# Patient Record
Sex: MALE | Race: ASIAN | NOT HISPANIC OR LATINO | Employment: OTHER
[De-identification: names, ages, dates, MRNs, and addresses within clinical notes are randomized per-mention and may not be internally consistent; named-entity substitution may affect disease eponyms.]

---

## 2024-02-02 ENCOUNTER — TRANSCRIBE ORDERS (OUTPATIENT)
Dept: OTHER | Age: 59
End: 2024-02-02

## 2024-02-02 ENCOUNTER — MEDICAL CORRESPONDENCE (OUTPATIENT)
Dept: HEALTH INFORMATION MANAGEMENT | Facility: CLINIC | Age: 59
End: 2024-02-02
Payer: MEDICARE

## 2024-02-02 DIAGNOSIS — H91.91 UNSPECIFIED HEARING LOSS, RIGHT EAR: Primary | ICD-10-CM

## 2025-01-20 ENCOUNTER — OFFICE VISIT (OUTPATIENT)
Dept: FAMILY MEDICINE | Facility: OTHER | Age: 60
End: 2025-01-20
Payer: MEDICARE

## 2025-01-20 VITALS
HEART RATE: 67 BPM | SYSTOLIC BLOOD PRESSURE: 128 MMHG | RESPIRATION RATE: 19 BRPM | WEIGHT: 124 LBS | DIASTOLIC BLOOD PRESSURE: 78 MMHG | TEMPERATURE: 96.6 F | BODY MASS INDEX: 24.35 KG/M2 | HEIGHT: 60 IN | OXYGEN SATURATION: 97 %

## 2025-01-20 DIAGNOSIS — Z79.4 TYPE 2 DIABETES MELLITUS WITH STAGE 3B CHRONIC KIDNEY DISEASE, WITH LONG-TERM CURRENT USE OF INSULIN (H): Primary | ICD-10-CM

## 2025-01-20 DIAGNOSIS — E78.5 HYPERLIPIDEMIA LDL GOAL <70: ICD-10-CM

## 2025-01-20 DIAGNOSIS — N18.32 TYPE 2 DIABETES MELLITUS WITH STAGE 3B CHRONIC KIDNEY DISEASE, WITH LONG-TERM CURRENT USE OF INSULIN (H): Primary | ICD-10-CM

## 2025-01-20 DIAGNOSIS — G89.29 CHRONIC BILATERAL LOW BACK PAIN WITHOUT SCIATICA: ICD-10-CM

## 2025-01-20 DIAGNOSIS — L30.9 ECZEMA, UNSPECIFIED TYPE: ICD-10-CM

## 2025-01-20 DIAGNOSIS — R41.3 MEMORY LOSS: ICD-10-CM

## 2025-01-20 DIAGNOSIS — I10 BENIGN ESSENTIAL HYPERTENSION: ICD-10-CM

## 2025-01-20 DIAGNOSIS — Z23 NEED FOR PNEUMOCOCCAL VACCINATION: ICD-10-CM

## 2025-01-20 DIAGNOSIS — Z23 NEED FOR INFLUENZA VACCINATION: ICD-10-CM

## 2025-01-20 DIAGNOSIS — M54.50 CHRONIC BILATERAL LOW BACK PAIN WITHOUT SCIATICA: ICD-10-CM

## 2025-01-20 DIAGNOSIS — F33.0 MILD RECURRENT MAJOR DEPRESSION: ICD-10-CM

## 2025-01-20 DIAGNOSIS — E11.22 TYPE 2 DIABETES MELLITUS WITH STAGE 3B CHRONIC KIDNEY DISEASE, WITH LONG-TERM CURRENT USE OF INSULIN (H): Primary | ICD-10-CM

## 2025-01-20 DIAGNOSIS — E03.9 HYPOTHYROIDISM, UNSPECIFIED TYPE: ICD-10-CM

## 2025-01-20 DIAGNOSIS — Z74.09 IMPAIRED MOBILITY: ICD-10-CM

## 2025-01-20 DIAGNOSIS — M1A.9XX1 CHRONIC TOPHACEOUS GOUT: ICD-10-CM

## 2025-01-20 DIAGNOSIS — J44.9 CHRONIC OBSTRUCTIVE PULMONARY DISEASE, UNSPECIFIED COPD TYPE (H): ICD-10-CM

## 2025-01-20 LAB
ALBUMIN SERPL BCG-MCNC: 4.4 G/DL (ref 3.5–5.2)
ALP SERPL-CCNC: 140 U/L (ref 40–150)
ALT SERPL W P-5'-P-CCNC: 36 U/L (ref 0–70)
ANION GAP SERPL CALCULATED.3IONS-SCNC: 11 MMOL/L (ref 7–15)
AST SERPL W P-5'-P-CCNC: 20 U/L (ref 0–45)
BILIRUB SERPL-MCNC: 1.3 MG/DL
BUN SERPL-MCNC: 19.7 MG/DL (ref 8–23)
CALCIUM SERPL-MCNC: 9.6 MG/DL (ref 8.8–10.4)
CHLORIDE SERPL-SCNC: 109 MMOL/L (ref 98–107)
CHOLEST SERPL-MCNC: 148 MG/DL
CREAT SERPL-MCNC: 1.38 MG/DL (ref 0.67–1.17)
EGFRCR SERPLBLD CKD-EPI 2021: 58 ML/MIN/1.73M2
EST. AVERAGE GLUCOSE BLD GHB EST-MCNC: 272 MG/DL
FASTING STATUS PATIENT QL REPORTED: YES
FASTING STATUS PATIENT QL REPORTED: YES
GLUCOSE SERPL-MCNC: 173 MG/DL (ref 70–99)
HBA1C MFR BLD: 11.1 % (ref 0–5.6)
HCO3 SERPL-SCNC: 24 MMOL/L (ref 22–29)
HDLC SERPL-MCNC: 31 MG/DL
LDLC SERPL CALC-MCNC: 76 MG/DL
NONHDLC SERPL-MCNC: 117 MG/DL
POTASSIUM SERPL-SCNC: 4.2 MMOL/L (ref 3.4–5.3)
PROT SERPL-MCNC: 7.4 G/DL (ref 6.4–8.3)
SODIUM SERPL-SCNC: 144 MMOL/L (ref 135–145)
T4 FREE SERPL-MCNC: 2.69 NG/DL (ref 0.9–1.7)
TRIGL SERPL-MCNC: 204 MG/DL
TSH SERPL DL<=0.005 MIU/L-ACNC: 0.29 UIU/ML (ref 0.3–4.2)
URATE SERPL-MCNC: 7.2 MG/DL (ref 3.4–7)

## 2025-01-20 PROCEDURE — 80061 LIPID PANEL: CPT | Performed by: PHYSICIAN ASSISTANT

## 2025-01-20 PROCEDURE — 84443 ASSAY THYROID STIM HORMONE: CPT | Performed by: PHYSICIAN ASSISTANT

## 2025-01-20 PROCEDURE — G0008 ADMIN INFLUENZA VIRUS VAC: HCPCS | Performed by: PHYSICIAN ASSISTANT

## 2025-01-20 PROCEDURE — 83036 HEMOGLOBIN GLYCOSYLATED A1C: CPT | Performed by: PHYSICIAN ASSISTANT

## 2025-01-20 PROCEDURE — 99207 PR FOOT EXAM NO CHARGE: CPT | Performed by: PHYSICIAN ASSISTANT

## 2025-01-20 PROCEDURE — 84439 ASSAY OF FREE THYROXINE: CPT | Performed by: PHYSICIAN ASSISTANT

## 2025-01-20 PROCEDURE — 84550 ASSAY OF BLOOD/URIC ACID: CPT | Performed by: PHYSICIAN ASSISTANT

## 2025-01-20 PROCEDURE — 90677 PCV20 VACCINE IM: CPT | Performed by: PHYSICIAN ASSISTANT

## 2025-01-20 PROCEDURE — 80053 COMPREHEN METABOLIC PANEL: CPT | Performed by: PHYSICIAN ASSISTANT

## 2025-01-20 PROCEDURE — 90673 RIV3 VACCINE NO PRESERV IM: CPT | Performed by: PHYSICIAN ASSISTANT

## 2025-01-20 PROCEDURE — 99205 OFFICE O/P NEW HI 60 MIN: CPT | Mod: 25 | Performed by: PHYSICIAN ASSISTANT

## 2025-01-20 PROCEDURE — 36415 COLL VENOUS BLD VENIPUNCTURE: CPT | Performed by: PHYSICIAN ASSISTANT

## 2025-01-20 PROCEDURE — G0009 ADMIN PNEUMOCOCCAL VACCINE: HCPCS | Performed by: PHYSICIAN ASSISTANT

## 2025-01-20 RX ORDER — EZETIMIBE 10 MG/1
10 TABLET ORAL DAILY
Qty: 90 TABLET | Refills: 3 | Status: SHIPPED | OUTPATIENT
Start: 2025-01-20

## 2025-01-20 RX ORDER — BENAZEPRIL HYDROCHLORIDE 10 MG/1
10 TABLET ORAL DAILY
Qty: 90 TABLET | Refills: 3 | Status: SHIPPED | OUTPATIENT
Start: 2025-01-20

## 2025-01-20 RX ORDER — BENAZEPRIL HYDROCHLORIDE 20 MG/1
1 TABLET ORAL DAILY
COMMUNITY
End: 2025-01-20

## 2025-01-20 RX ORDER — VITAMIN B COMPLEX
2000 TABLET ORAL DAILY
COMMUNITY

## 2025-01-20 RX ORDER — ATORVASTATIN CALCIUM 80 MG/1
1 TABLET, FILM COATED ORAL AT BEDTIME
COMMUNITY
End: 2025-01-20

## 2025-01-20 RX ORDER — GABAPENTIN 300 MG/1
300 CAPSULE ORAL AT BEDTIME
Qty: 90 CAPSULE | Refills: 3 | Status: SHIPPED | OUTPATIENT
Start: 2025-01-20

## 2025-01-20 RX ORDER — EZETIMIBE 10 MG/1
1 TABLET ORAL DAILY
COMMUNITY
End: 2025-01-20

## 2025-01-20 RX ORDER — INSULIN GLARGINE 100 [IU]/ML
35 INJECTION, SOLUTION SUBCUTANEOUS AT BEDTIME
Qty: 30 ML | Refills: 1 | Status: SHIPPED | OUTPATIENT
Start: 2025-01-20

## 2025-01-20 RX ORDER — INSULIN LISPRO 100 [IU]/ML
15 INJECTION, SOLUTION INTRAVENOUS; SUBCUTANEOUS
Qty: 30 ML | Refills: 1 | Status: SHIPPED | OUTPATIENT
Start: 2025-01-20

## 2025-01-20 RX ORDER — GABAPENTIN 300 MG/1
300 CAPSULE ORAL AT BEDTIME
COMMUNITY
Start: 2023-02-07 | End: 2025-01-20

## 2025-01-20 RX ORDER — INSULIN GLARGINE 100 [IU]/ML
35 INJECTION, SOLUTION SUBCUTANEOUS AT BEDTIME
COMMUNITY
Start: 2023-02-07 | End: 2025-01-20

## 2025-01-20 RX ORDER — LEVOTHYROXINE SODIUM 175 UG/1
175 TABLET ORAL
COMMUNITY

## 2025-01-20 RX ORDER — ATORVASTATIN CALCIUM 80 MG/1
80 TABLET, FILM COATED ORAL AT BEDTIME
Qty: 90 TABLET | Refills: 3 | Status: SHIPPED | OUTPATIENT
Start: 2025-01-20

## 2025-01-20 RX ORDER — DULAGLUTIDE 4.5 MG/.5ML
4.5 INJECTION, SOLUTION SUBCUTANEOUS WEEKLY
Qty: 6 ML | Refills: 1 | Status: SHIPPED | OUTPATIENT
Start: 2025-01-20

## 2025-01-20 RX ORDER — ALLOPURINOL 100 MG/1
100 TABLET ORAL DAILY
COMMUNITY
End: 2025-01-21

## 2025-01-20 RX ORDER — DULAGLUTIDE 4.5 MG/.5ML
4.5 INJECTION, SOLUTION SUBCUTANEOUS WEEKLY
COMMUNITY
Start: 2024-03-07 | End: 2025-01-20

## 2025-01-20 RX ORDER — INSULIN LISPRO 100 [IU]/ML
15 INJECTION, SOLUTION INTRAVENOUS; SUBCUTANEOUS
COMMUNITY
Start: 2025-01-10 | End: 2025-01-20

## 2025-01-20 RX ORDER — ASPIRIN 81 MG/1
81 TABLET ORAL DAILY
Qty: 90 TABLET | Refills: 3 | Status: SHIPPED | OUTPATIENT
Start: 2025-01-20

## 2025-01-20 RX ORDER — ESCITALOPRAM OXALATE 10 MG/1
1 TABLET ORAL DAILY
COMMUNITY
End: 2025-01-20

## 2025-01-20 RX ORDER — ASPIRIN 81 MG/1
1 TABLET ORAL DAILY
COMMUNITY
End: 2025-01-20

## 2025-01-20 RX ORDER — ESCITALOPRAM OXALATE 10 MG/1
15 TABLET ORAL DAILY
Qty: 135 TABLET | Refills: 3 | Status: SHIPPED | OUTPATIENT
Start: 2025-01-20

## 2025-01-20 ASSESSMENT — PAIN SCALES - GENERAL: PAINLEVEL_OUTOF10: NO PAIN (0)

## 2025-01-20 NOTE — PATIENT INSTRUCTIONS
Get back on the insulin.    Will check updated labs today.    Stay well hydrated.    Will cut back on the enalapril to avoid blood pressure getting too low.  If blood pressure gets too high (above 140/90 consistently), let me know.    Will order physical therapy for your house.    Will have him see a nephrologist, rheumatologist, neurologist and mental health specialist.    Will increase the escitalopram to 15 mg (1.5 tablets) for his mood.    Try Vaseline and hydrocortisone for the rash on the forehead.    Follow-up in 3 months.

## 2025-01-20 NOTE — PROGRESS NOTES
Assessment & Plan       ICD-10-CM    1. Type 2 diabetes mellitus with stage 3b chronic kidney disease, with long-term current use of insulin (H)  E11.22 Comprehensive metabolic panel (BMP + Alb, Alk Phos, ALT, AST, Total. Bili, TP)    N18.32 Hemoglobin A1c    Z79.4 Comprehensive metabolic panel (BMP + Alb, Alk Phos, ALT, AST, Total. Bili, TP)     Dulaglutide (TRULICITY) 4.5 MG/0.5ML SOAJ     HUMALOG KWIKPEN 100 UNIT/ML soln     LANTUS SOLOSTAR 100 UNIT/ML soln     Home Care Referral     Continuous Glucose Sensor (FREESTYLE ELPIDIO 2 SENSOR) [facility]     FOOT EXAM     Adult Nephrology  Referral     Comprehensive metabolic panel (BMP + Alb, Alk Phos, ALT, AST, Total. Bili, TP)     Hemoglobin A1c     CANCELED: Comprehensive metabolic panel (BMP + Alb, Alk Phos, ALT, AST, Total. Bili, TP)      2. Benign essential hypertension  I10 benazepril (LOTENSIN) 10 MG tablet     Comprehensive metabolic panel (BMP + Alb, Alk Phos, ALT, AST, Total. Bili, TP)     Comprehensive metabolic panel (BMP + Alb, Alk Phos, ALT, AST, Total. Bili, TP)     Comprehensive metabolic panel (BMP + Alb, Alk Phos, ALT, AST, Total. Bili, TP)     CANCELED: Comprehensive metabolic panel (BMP + Alb, Alk Phos, ALT, AST, Total. Bili, TP)      3. Hyperlipidemia LDL goal <70  E78.5 Lipid panel reflex to direct LDL Non-fasting     atorvastatin (LIPITOR) 80 MG tablet     aspirin 81 MG EC tablet     ezetimibe (ZETIA) 10 MG tablet     Lipid panel reflex to direct LDL Non-fasting      4. Chronic tophaceous gout  M1A.9XX1 Uric acid     Adult Rheumatology  Referral     Uric acid      5. Hypothyroidism, unspecified type  E03.9 TSH with free T4 reflex     TSH with free T4 reflex      6. Mild recurrent major depression  F33.0 Adult Mental Health  Referral     Adult Mental Health  Referral     escitalopram (LEXAPRO) 10 MG tablet      7. Chronic obstructive pulmonary disease, unspecified COPD type (H)  J44.9 Home Care Referral      8.  Impaired mobility  Z74.09 Home Care Referral      9. Memory loss  R41.3 Adult Neurology [facility] Referral     Home Care Referral      10. Chronic bilateral low back pain without sciatica  M54.50 gabapentin (NEURONTIN) 300 MG capsule    G89.29 Home Care Referral      11. Eczema, unspecified type  L30.9       12. Need for pneumococcal vaccination  Z23 PNEUMOCOCCAL 20 VALENT CONJUGATE (PREVNAR 20)      13. Need for influenza vaccination  Z23 INFLUENZA VACCINE TRIVALENT(FLUBLOK)          1. Unfortunately, his diabetic control is poor but he has been without insulin for multiple weeks this past month. Will refill Lantus to continue 35 units nightly along with Humalog 15 units prior to meals. He remains on weekly Trulicity and I recommend he avoid skipping meals. He should continue with close glucose monitoring and he declines a diabetic educator referral at this time. He is flirting with stage 4 CKD so will refer to nephrology for further evaluation. Continue with good hydration and low salt. Will plan on recheck in 3 months.    2. Blood pressure has been well controlled on benazepril but he has lightheadedness when systolic readings are less than 130. Will try decreasing the dose to 10 mg and I recommend close home BP monitoring. If consistently above 140-150/90, they will let me know.     3. Updated fasting lipid panel ordered. Continue Zetia and atorvastatin.     4, 8, 10. Chronic pain from tophaceous gout and his low back. Continue nightly gabapentin. His daughter states he was previously on 300 mg of allopurinol but was recently restarted back on 100 mg. Will recheck a uric acid today along with kidney function and will increase as needed. Will also refer to rheumatology through [facility]. He has been less mobile lately and often confined to a wheelchair. I recommend home physical therapy so referral has been placed. He does not drive any longer.     5. Updated thyroid labs ordered. Will refill levothyroxine  "depending on lab results.    6. His mood has been worse as he is not able to walk much or do the things he used to. Will increase Lexapro to 15 mg and referral to psychiatry and for counseling.     7. Continue albuterol inhaler as needed.    9. His daughter has noticed worsening memory so will refer to neurology for further evaluation. I recommend he keep his mind active with puzzles, games, reading, etc. He should also stay physically active.    11. He has an eczematous area over his forehead. I recommend Vaseline and hydrocortisone cream.    12-13. Vaccines administered by MA.    Follow-up in 3 months.    A total of 60 minutes spent on reviewing history, completing exam, discussing plan and completing note.      BMI  Estimated body mass index is 25.92 kg/m  as calculated from the following:    Height as of this encounter: 1.473 m (4' 10\").    Weight as of this encounter: 56.2 kg (124 lb).   Weight management plan: Patient referred to endocrine and/or weight management specialty        Gurjit Ortiz is a 59 year old, presenting for the following health issues:  Establish Care        1/20/2025    12:12 PM   Additional Questions   Roomed by eleanor   Accompanied by shannon         1/20/2025    12:12 PM   Patient Reported Additional Medications   Patient reports taking the following new medications allopurinol 100 mg, escitalopram 10 mg, ezetimibe 10 mg, levothyroxine 0.175 mg, asprin 81 mg, benazepril 20 mg, atrovastatin 80 mg, insulin     Patient's daughter would like to talk about FMLA.  History of Present Illness       Reason for visit:  Establish care with new doctor   He is taking medications regularly.     Visit completed with assistance of patient's daughter and [employer]ura video interpretor.    He is here to establish care today. He lived in [place] for a number of years after immigrating to the  and has been seeing a [facility] provider in [place] the past few years. He lives with his daughter in [place] and " "wanted to find a provider closer to home. He has a history of insulin dependent type 2 diabetes, COPD, hypertension, hyperlipidemia, tophaceous gout, chronic low back pain, vitamin D deficiency, depression and hypothyroidism. His daughter is concerned about his declined in health over the past year. He has become less mobile due to his chronic pain in the low back along with his feet and elbows due to his tophaceous gout. He is not walking as much and is often in a wheelchair now for mobility and his legs seem to be getting weaker. This has caused increased depression and he sometimes tells her he would rather not be here anymore. He denies any active thoughts of self harm. She also states his memory has been worsening as he is forgetting more things and sometimes he talks to himself. She is hoping to get referrals for a mental health specialist, neurologist, physical therapy, nephrology and rheumatologist. He has an appointment scheduled with audiology in the near future due to hearing loss.    His daughter mentions that if his systolic blood pressure is lower than 130, he feels dizzy, lightheadedness and sometimes has headaches. She is wondering if his medication needs to be adjusted. He has been out of insulin the past few weeks as he ran out. He initially had some insurances issues towards the end of this past year as he could not get insulin refills but now he just ran out of his insulin. He remains on Humalog, Lantus and weekly Trulicity. He sometimes skips meals per his daughter but other times eats normally. He has a Freestyle Jose 2 CGM.      Review of Systems  Constitutional, HEENT, cardiovascular, pulmonary, gi and gu systems are negative, except as otherwise noted.      Objective    /78   Pulse 67   Temp (!) 96.6  F (35.9  C) (Temporal)   Resp 19   Ht 1.473 m (4' 10\")   Wt 56.2 kg (124 lb)   SpO2 97%   BMI 25.92 kg/m    Body mass index is 25.92 kg/m .  Physical Exam   GENERAL: alert and no " distress  EYES: Eyes grossly normal to inspection, PERRL and conjunctivae and sclerae normal  RESP: lungs clear to auscultation - no rales, rhonchi or wheezes  CV: regular rate and rhythm, normal S1 S2, no S3 or S4, no murmur, click or rub, no peripheral edema  MS: Multiple tophi over bilateral feet, ankles and elbows. No current erythema or significant tenderness.   SKIN: Scaly, erythematous rash of forehead.   NEURO: Normal strength and tone, mentation intact and speech normal. Gait is not tested.   PSYCH: mentation appears normal, affect is blunted    Results for orders placed or performed in visit on 01/20/25   Hemoglobin A1c     Status: Abnormal   Result Value Ref Range    Estimated Average Glucose 272 (H) <117 mg/dL    Hemoglobin A1C 11.1 (H) 0.0 - 5.6 %    Narrative    Results confirmed by repeat test.              Signed Electronically by: Lenin Jacob PA-C

## 2025-01-21 ENCOUNTER — APPOINTMENT (OUTPATIENT)
Dept: INTERPRETER SERVICES | Facility: CLINIC | Age: 60
End: 2025-01-21
Payer: MEDICARE

## 2025-01-21 ENCOUNTER — TELEPHONE (OUTPATIENT)
Dept: FAMILY MEDICINE | Facility: OTHER | Age: 60
End: 2025-01-21
Payer: MEDICARE

## 2025-01-21 DIAGNOSIS — M1A.9XX1 CHRONIC TOPHACEOUS GOUT: Primary | ICD-10-CM

## 2025-01-21 DIAGNOSIS — Z79.4 TYPE 2 DIABETES MELLITUS WITH STAGE 3B CHRONIC KIDNEY DISEASE, WITH LONG-TERM CURRENT USE OF INSULIN (H): Primary | ICD-10-CM

## 2025-01-21 DIAGNOSIS — N18.32 TYPE 2 DIABETES MELLITUS WITH STAGE 3B CHRONIC KIDNEY DISEASE, WITH LONG-TERM CURRENT USE OF INSULIN (H): Primary | ICD-10-CM

## 2025-01-21 DIAGNOSIS — E11.22 TYPE 2 DIABETES MELLITUS WITH STAGE 3B CHRONIC KIDNEY DISEASE, WITH LONG-TERM CURRENT USE OF INSULIN (H): Primary | ICD-10-CM

## 2025-01-21 RX ORDER — ALLOPURINOL 100 MG/1
200 TABLET ORAL DAILY
Qty: 180 TABLET | Refills: 3 | Status: SHIPPED | OUTPATIENT
Start: 2025-01-21

## 2025-01-21 RX ORDER — LEVOTHYROXINE SODIUM 150 UG/1
150 TABLET ORAL
Qty: 90 TABLET | Refills: 1 | Status: SHIPPED | OUTPATIENT
Start: 2025-01-21

## 2025-01-21 NOTE — TELEPHONE ENCOUNTER
Patient Contact    Attempt # 1    Was call answered?  No. Left message on voicemail with information to call clinic and ask to speak to a Triage Nurse. Relay message from provider below. Also need a verbal consent to communicate and have them fill out paperwork in clinic for consent to communicate.        Please call daughter. We were supposed to fill out NADIA for her yesterday but not sure if we did. Please notify patient that his uric acid remains elevated but kidney function has actually improved. Let's increase his allopurinol to 200 mg (2 tablets) daily to help with the gout. Also, his thyroid is over replaced so will decrease his levothyroxine to 150 mcg daily. New prescriptions sent. His diabetes is poorly controlled with an A1c of 11.1 so it is important that he get back on his insulin which was refilled yesterday. I recommend small, frequent meals throughout the day and monitor the sweets and carbs. Thanks!     George Hensley RN on 1/21/2025 at 10:55 AM

## 2025-01-21 NOTE — TELEPHONE ENCOUNTER
Dulaglutide (TRULICITY) 4.5 MG/0.5ML SOAJ     Prior Authorization Retail Medication Request    Medication/Dose: Dulaglutide (TRULICITY) 4.5 MG/0.5ML SOAJ  Diagnosis and ICD code (if different than what is on RX):    New/renewal/insurance change PA/secondary ins. PA:  Previously Tried and Failed:    Rationale:      Insurance   Primary:   Insurance ID:      Secondary (if applicable):  Insurance ID:      Pharmacy Information (if different than what is on RX)  Name:    Phone:    Fax:    Clinic Information  Preferred routing pool for dept communication:

## 2025-01-21 NOTE — TELEPHONE ENCOUNTER
Daughter returning call. She states they signed the form in the clinic. It has not been uploaded to chart yet. Patient sleeping and can not give verbal C2C. RN can see general consent for service and is wondering if they got confused about submitting the proper form/did not fill out C2C.     Daughter will callback when patient wakes up to give verbal consent.     Reginald Pratt RN on 1/21/2025 at 11:03 AM

## 2025-01-21 NOTE — TELEPHONE ENCOUNTER
Please call daughter. We were supposed to fill out NADIA for her yesterday but not sure if we did. Please notify patient that his uric acid remains elevated but kidney function has actually improved. Let's increase his allopurinol to 200 mg (2 tablets) daily to help with the gout. Also, his thyroid is over replaced so will decrease his levothyroxine to 150 mcg daily. New prescriptions sent. His diabetes is poorly controlled with an A1c of 11.1 so it is important that he get back on his insulin which was refilled yesterday. I recommend small, frequent meals throughout the day and monitor the sweets and carbs. Thanks!    George Jacob PA-C

## 2025-01-21 NOTE — LETTER
January 24, 2025      Diana Quezada  [address] [facility] [address]        Dear Diana Iraheta    We attempted to call you but were unable to reach you by phone.     We are writing to inform you of your test results from your provider:    Please notify patient that his uric acid remains elevated but kidney function has actually improved. Let's increase his allopurinol to 200 mg (2 tablets) daily to help with the gout. Also, his thyroid is over replaced so will decrease his levothyroxine to 150 mcg daily. New prescriptions sent. His diabetes is poorly controlled with an A1c of 11.1 so it is important that he get back on his insulin which was refilled yesterday. I recommend small, frequent meals throughout the day and monitor the sweets and carbs. Thanks!     George Jacob PA-C     Also, we are unable to locate a form that may have been signed giving us permission to speak with your family members on your behalf with your medical information. Please stop in the clinic when you are able to fill out this paperwork: it is a specific form called a Consent to Communicate. This is different than a general consent form that may have been filled out during your visit.     If you have any questions or concerns, please call the clinic at the number listed above.       Sincerely,    [facility] Triage Nurse Team        [place] [phone]   [place] [phone]  [place] [phone]

## 2025-01-22 ENCOUNTER — TELEPHONE (OUTPATIENT)
Dept: FAMILY MEDICINE | Facility: OTHER | Age: 60
End: 2025-01-22
Payer: MEDICARE

## 2025-01-22 NOTE — TELEPHONE ENCOUNTER
Home Care is calling regarding an established patient with [facility].       Requesting orders from: Lenin Jacob  Provider is following patient: Yes  Is this a 60-day recertification request?  No    Orders Requested    Physical Therapy  Request for delay in care, service is not able to be provided within same scheduled day.   Patient requesting care start on 2/3/25      Information was gathered and will be sent to provider for review.  RN will contact Home Care with information after provider review.  Confirmed ok to leave a detailed message with call back.  Contact information confirmed and updated as needed.    Cristy Del Real RN

## 2025-01-22 NOTE — TELEPHONE ENCOUNTER
Left detailed message on nurse line providing verbal approval for requested orders.     Shayy Becker BSN, RN

## 2025-01-22 NOTE — TELEPHONE ENCOUNTER
I have not seen a C2C come through of George yet. If patient stopped in and completed this at  may need to speak with them about it.     Kristie Almonte MA

## 2025-01-23 NOTE — TELEPHONE ENCOUNTER
Loly RN with [facility] is calling to verify verbal orders and time of orders one more time.  Reviewed with RN.  Verbalized understanding. Denies any other questions or concerns at this time.

## 2025-01-23 NOTE — TELEPHONE ENCOUNTER
Patient Contact    Attempt # 3    Was call answered?  No. Left message on voicemail with information to call clinic and ask to speak to a Triage Nurse. Relay message from provider below. Also need a verbal consent to communicate and have them fill out paperwork in clinic for consent to communicate.          Please call daughter. We were supposed to fill out NADIA for her yesterday but not sure if we did. Please notify patient that his uric acid remains elevated but kidney function has actually improved. Let's increase his allopurinol to 200 mg (2 tablets) daily to help with the gout. Also, his thyroid is over replaced so will decrease his levothyroxine to 150 mcg daily. New prescriptions sent. His diabetes is poorly controlled with an A1c of 11.1 so it is important that he get back on his insulin which was refilled yesterday. I recommend small, frequent meals throughout the day and monitor the sweets and carbs. Thanks!     George Jacob PA-C

## 2025-01-24 NOTE — TELEPHONE ENCOUNTER
C2C was completed but not yet in system. They both gave me verbal C2C during the visit so okay to contact her.'    George Jacob PA-C

## 2025-01-24 NOTE — TELEPHONE ENCOUNTER
PRIOR AUTHORIZATION DENIED    Medication: TRULICITY 4.5 MG/0.5ML SC SOAJ  Insurance Company: [employer]'s - Phone [phone] Fax [phone]  Denial Date: 1/24/2025  Denial Reason(s): Excluded   Appeal Information: NA  Patient Notified: NO

## 2025-01-24 NOTE — TELEPHONE ENCOUNTER
Patient Contact    Attempt # 1    RN did attempt to reach patient. No answer. Message left for patient to call the clinic back and ask to speak to a member of the care team.     Lori Renee RN on 1/24/2025 at 12:40 PM     No C2C on file to speak with patient's daughter.

## 2025-01-24 NOTE — TELEPHONE ENCOUNTER
With first attempt to call out, message was left on number listed for daughter's phone. Will postpone to await call back.

## 2025-01-24 NOTE — TELEPHONE ENCOUNTER
FYI, no callback from patient.     Will send letter with results and advising to stop in clinic to fill out C2C.     Lori Renee, RN, BSN

## 2025-01-24 NOTE — TELEPHONE ENCOUNTER
Can we figure out what GLP-1 medications are covered under his insurance? Can we call daughter?    George Jacob PA-C

## 2025-01-27 NOTE — TELEPHONE ENCOUNTER
Patient Contact    Attempt # 1    Was call answered?  No.  Left message on voicemail with information to call clinic and ask to speak to a Triage Nurse. Upon callback, relay message from provider below:        Rhonda Hensley RN on 1/27/2025 at 11:59 AM

## 2025-01-27 NOTE — TELEPHONE ENCOUNTER
RN called and spoke with daughter Yady, she reports she does not know what medication is covered by insurance, she reports they usually have to do the generic medications but she does not know the names. She reports patient use to use Trulicity but when his insurance changed he no longer uses it. She reports they are OK with trying whichever medication the provider recommends.    Routing to provider.    Rhonda Hensley RN on 1/27/2025 at 11:08 AM

## 2025-01-27 NOTE — TELEPHONE ENCOUNTER
Let's try Jardiance instead of another injectable. If he has any urinary side effects (rash in the groin area, UTI symptoms) or frequent low glucose readings on this, let me know. This will help protect his kidneys as well. Thanks!    George Jacob PA-C

## 2025-01-28 ENCOUNTER — APPOINTMENT (OUTPATIENT)
Dept: INTERPRETER SERVICES | Facility: CLINIC | Age: 60
End: 2025-01-28
Payer: MEDICARE

## 2025-01-28 NOTE — TELEPHONE ENCOUNTER
Patient Contact    Attempt # 2    RN did attempt to reach patient. No answer. Message left via [employer]  for patient to call the clinic back and ask to speak to a member of the care team. Wanting to update medication recommendation per provider below.     Lori Renee, RN on 1/28/2025 at 9:32 AM

## 2025-01-29 ENCOUNTER — APPOINTMENT (OUTPATIENT)
Dept: INTERPRETER SERVICES | Facility: CLINIC | Age: 60
End: 2025-01-29
Payer: MEDICARE

## 2025-01-29 NOTE — TELEPHONE ENCOUNTER
RN did call and speak with daughter Yady. We did review message below from provider. Daughter did verbalize understanding and is agreeable.  They will go  the medication.  Denies any questions or concerns at this time.

## 2025-01-30 ENCOUNTER — OFFICE VISIT (OUTPATIENT)
Dept: AUDIOLOGY | Facility: OTHER | Age: 60
End: 2025-01-30
Payer: MEDICARE

## 2025-01-30 DIAGNOSIS — H69.92 TYPE C TYMPANOGRAM OF LEFT EAR: ICD-10-CM

## 2025-01-30 DIAGNOSIS — H61.21 IMPACTED CERUMEN OF RIGHT EAR: Primary | ICD-10-CM

## 2025-01-30 NOTE — PROGRESS NOTES
AUDIOLOGY REPORT    SUBJECTIVE:  Diana Quezada is a 59 year old male who was seen in the Audiology Clinic at the [facility] for audiologic evaluation, referred by Ashley Cody MD. The patient reports known hearing loss that has gotten worse over time. He currently wears an amplifier in the right ear, and reports that he used to have hearing aids that were fit in [place], but they stopped working. No previous audiology records were available for review, but the patient notes that his hearing loss was first diagnosed in 2016. He reports constant bilateral tinnitus. The patient reports that he does not have ear pain, ear pressure, history of ear problems or ear surgery, history of loud noise exposure, or family history of hearing loss. The patient notes difficulty with communication in a variety of listening situations. He was accompanied to the appointment by his daughter-in-law and son. : Yes: Language: Richardson (video), ID Number/Identifier: Marcelino Galarza .    OBJECTIVE:  Otoscopic exam indicates cerumen bilaterally, possibly fully occluding in the right ear    Tympanogram:    RIGHT: restricted eardrum mobility (type B with a suggestion of a negative pressure peak) with a smaller than expected ear canal volume volume     LEFT:   negative pressure and restricted eardrum mobility     ASSESSMENT:     ICD-10-CM    1. Impacted cerumen of right ear  H61.21 Tympanometry (impedance - testing) (08865)      2. Type C tympanogram of left ear  H69.92 Tympanometry (impedance - testing) (61368)          Today s results were discussed with the patient in detail. No additional testing was completed due to results consistent with fully occluding cerumen in the right ear.    PLAN:  It is recommended that the patient return after having his ears cleaned. Discussed options for ear cleaning, such as primary care, urgent care, or ENT. He is scheduled for a hearing aid fitting and will need to return for a  hearing evaluation and hearing aid consultation prior to that. Discussed options for scheduling, and that rescheduling the remaining hearing aid visits may be necessary depending on timing. The patient and his daughter-in-law were provided with scheduling information. Please call this clinic with questions regarding these results or recommendations.      Cuca Metcalf, CCC-A  [place] Licensed Audiologist #[id number]  1/30/2025

## 2025-02-03 ENCOUNTER — TELEPHONE (OUTPATIENT)
Dept: FAMILY MEDICINE | Facility: OTHER | Age: 60
End: 2025-02-03
Payer: MEDICARE

## 2025-02-03 NOTE — TELEPHONE ENCOUNTER
Patient Contact    Attempt # 1    Was call answered?  No.  Unable to leave message. Attempted to call Home Care to relay orders, call continued to ring, no answer, no way to leave message.    Rhonda Hensley RN on 2/3/2025 at 11:13 AM

## 2025-02-03 NOTE — TELEPHONE ENCOUNTER
Home Care is calling regarding an established patient with [facility].     Requesting orders from: Lenin Jacob  Provider is following patient: Yes  Is this a 60-day recertification request?  No    Orders Requested    Physical Therapy  Request for delay in care, service is not able to be provided within same scheduled day.   Services delayed to 2/10/25 per daughter and patient request.    When returning call to PT, please note date and time provider approved orders.  Information was gathered and will be sent to provider for review.  RN will contact Home Care with information after provider review.  Confirmed ok to leave a detailed message with call back.  Contact information confirmed and updated as needed.    Cristy Del Real RN

## 2025-02-03 NOTE — TELEPHONE ENCOUNTER
Patient Contact    Attempt # 2    Called Isis with [facility]. Was call answered?  No.  Left message on secure voicemail with order approval from provider below:      Also called and left message for Daughter Yady to call the clinic, upon callback please relay information regarding scheduling with different specialties.    Will close this encounter.    Rhonda Hensley RN on 2/3/2025 at 3:36 PM

## 2025-02-03 NOTE — TELEPHONE ENCOUNTER
Agree with plan. Please also notify daughter they multiple specialists have reached out to schedule appts without success including neurology and rheumatology so please give her numbers to call.    George Jacob PA-C

## 2025-02-11 ENCOUNTER — TELEPHONE (OUTPATIENT)
Dept: FAMILY MEDICINE | Facility: OTHER | Age: 60
End: 2025-02-11
Payer: MEDICARE

## 2025-02-11 NOTE — TELEPHONE ENCOUNTER
Forms printed and placed in  bin for review.       Forms/Letter Request    Type of form/letter: FMLA - Unknown      Is Release of Information needed?: No    Do we have the form/letter: Yes:     Who is the form from? Patient    Where did/will the form come from? form was sent via [employer]    When is form/letter needed by: asap    How would you like the form/letter returned: Fax : [phone]    Patient Notified form requests are processed in 5-7 business days:Yes    Could we send this information to you in [employer] or would you prefer to receive a phone call?:   Patient would like to be contacted via [employer]

## 2025-02-11 NOTE — TELEPHONE ENCOUNTER
Message via CRM:    Topic: Non-Medical Question.     Please have my doctor sign FMLA paperwork for my daughter and fax it to Jeremie and a copy to me.    Attachments   [email or web address].pdf

## 2025-02-16 ENCOUNTER — HEALTH MAINTENANCE LETTER (OUTPATIENT)
Age: 60
End: 2025-02-16

## 2025-03-06 ENCOUNTER — TELEPHONE (OUTPATIENT)
Dept: AUDIOLOGY | Facility: CLINIC | Age: 60
End: 2025-03-06
Payer: MEDICARE

## 2025-03-06 NOTE — TELEPHONE ENCOUNTER
Health Call Center    Phone Message    May a detailed message be left on voicemail: yes     Reason for Call: Other: Patient's daughter called and they wanted to transfer care to [facility]. Did a hearing aide consult with audio at [facility], they weren't sure any testing was done. As a precaution I scheduled a consult w/ audio with Danny Harris. Are you able to review and see if they had actual testing done to make sure the apt is correct? Thank you.      Action Taken: Message routed to:  Other: Audiology     Travel Screening: Not Applicable     Date of Service:

## 2025-03-11 NOTE — PROGRESS NOTES
AUDIOLOGY REPORT    SUBJECTIVE:  Diana Quezada is a 59 year old male who was seen in the Audiology Clinic at the [facility] for audiologic evaluation. The patient has been seen previously at a [facility] clinic on 1/30/2025 for assessment and results indicated cerumen impaction bilaterally.    The patient reports he cannot hear very well. The patient reports ringing in his left ear. The patient reports he occasionally hears sounds as if he has water in his ears. The patient reports he does wear hearing aids but recently lost one. The patient denies ear pain, drainage, dizziness, and ear surgeries.  The patient notes difficulty with communication in a variety of listening situations.  They were accompanied today by their daughter. Yes: Language: Richardson (video), ID Number/Identifier: [id number].    OBJECTIVE:  Otoscopic exam indicates cerumen deep in ear canals with eardrums partially visualized.    Pure Tone Thresholds assessed using conventional audiometry with good  reliability from 250-8000 Hz bilaterally using insert earphones and circumaural headphones     RIGHT:  severe sloping to profound mixed hearing loss    LEFT:    severe sloping to profound mixed hearing loss  Note masking dilemmas bilaterally.    Tympanogram:    RIGHT: restricted eardrum mobility     LEFT:   restricted eardrum mobility     Speech Detection  Threshold:    RIGHT: 75 dB HL    LEFT:   75 dB HL    Word Recognition Score: did not test as no in person [place]  was available.    ASSESSMENT: Today's testing revealed bilateral mixed hearing loss with masking dilemmas. Today s results were discussed with the patient in detail.     PLAN:  Patient was counseled regarding hearing loss and impact on communication.  Patient is a good candidate for amplification at this time.   It is recommended that the patient follow up with ENT for mixed hearing loss revealed today and for medical clearance for hearing aids. The patient is  scheduled to return for a hearing aid consult in a few weeks. Please call this clinic with questions regarding these results or recommendations.    Cuca Sifuentes, CCC-A  Doctor of Audiology, [place] #[id number]   March 24, 2025

## 2025-03-12 ENCOUNTER — OFFICE VISIT (OUTPATIENT)
Dept: OPTOMETRY | Facility: CLINIC | Age: 60
End: 2025-03-12
Payer: MEDICARE

## 2025-03-12 DIAGNOSIS — N18.32 TYPE 2 DIABETES MELLITUS WITH STAGE 3B CHRONIC KIDNEY DISEASE, WITH LONG-TERM CURRENT USE OF INSULIN (H): Primary | ICD-10-CM

## 2025-03-12 DIAGNOSIS — E11.22 TYPE 2 DIABETES MELLITUS WITH STAGE 3B CHRONIC KIDNEY DISEASE, WITH LONG-TERM CURRENT USE OF INSULIN (H): Primary | ICD-10-CM

## 2025-03-12 DIAGNOSIS — Z79.4 TYPE 2 DIABETES MELLITUS WITH STAGE 3B CHRONIC KIDNEY DISEASE, WITH LONG-TERM CURRENT USE OF INSULIN (H): Primary | ICD-10-CM

## 2025-03-12 PROCEDURE — 92002 INTRM OPH EXAM NEW PATIENT: CPT

## 2025-03-12 ASSESSMENT — CONF VISUAL FIELD
OS_INFERIOR_NASAL_RESTRICTION: 0
OS_NORMAL: 1
OD_INFERIOR_TEMPORAL_RESTRICTION: 0
OD_SUPERIOR_TEMPORAL_RESTRICTION: 0
OS_SUPERIOR_NASAL_RESTRICTION: 0
OS_SUPERIOR_TEMPORAL_RESTRICTION: 0
OD_SUPERIOR_NASAL_RESTRICTION: 0
OD_NORMAL: 1
OS_INFERIOR_TEMPORAL_RESTRICTION: 0
OD_INFERIOR_NASAL_RESTRICTION: 0

## 2025-03-12 ASSESSMENT — SLIT LAMP EXAM - LIDS
COMMENTS: DERMATOCHALASIS
COMMENTS: DERMATOCHALASIS

## 2025-03-12 ASSESSMENT — VISUAL ACUITY
OS_PH_SC+: -1
OS_SC: 20/40
METHOD: SNELLEN - LINEAR
OD_SC: 20/50
OS_SC+: +2
OS_SC: 20/70
OS_PH_SC: 20/25
OD_SC: 20/25
OD_SC+: -2

## 2025-03-12 ASSESSMENT — EXTERNAL EXAM - RIGHT EYE: OD_EXAM: NORMAL

## 2025-03-12 ASSESSMENT — TONOMETRY
OD_IOP_MMHG: 18
IOP_METHOD: TONOPEN
OS_IOP_MMHG: 15

## 2025-03-12 ASSESSMENT — CUP TO DISC RATIO
OS_RATIO: 0.65
OD_RATIO: 0.65

## 2025-03-12 ASSESSMENT — EXTERNAL EXAM - LEFT EYE: OS_EXAM: NORMAL

## 2025-03-12 NOTE — LETTER
3/12/2025      Diana Quezada  [address] [address] [address]      Dear Colleague,    Thank you for referring your patient, Diana Quezada, to the [facility]. Please see a copy of my visit note below.    Chief Complaint   Patient presents with     Diabetic Eye Exam     Accompanied by son who is interpreting   Chief Complaint(s) and History of Present Illness(es)       Diabetic Eye Exam              Diabetes Type: Type 2 and on insulin    Blood Sugars: is controlled                   Lab Results   Component Value Date    A1C 11.1 01/20/2025            Last Eye Exam: 2018 or 2019  Dilated Previously: Yes    What are you currently using to see?  Had readers for a while but has since been misplaced.     Distance Vision Acuity: Satisfied with vision    Near Vision Acuity: Not satisfied     Eye Comfort: good  Do you use eye drops? : No      Denelle Rebecca - Optometric Assistant     Medical, surgical and family histories reviewed and updated 3/12/2025.       OBJECTIVE: See Ophthalmology exam    ASSESSMENT:    ICD-10-CM    1. Type 2 diabetes mellitus with stage 3b chronic kidney disease, with long-term current use of insulin (H)  E11.22 EYE EXAM (SIMPLE-NONBILLABLE)    N18.32     Z79.4           PLAN:    Diana Quezada aware  eye exam results will be sent to [facility].  Patient Instructions   T2DM: Mild-moderate non-proliferative diabetic retinopathy with macular edema right>left.  A1c 11.1, patient will return for refraction once blood sugar is stabilized.  Patient educated on condition. Stressed importance of close BS/BP monitoring, diet/exercise, medication compliance, and regular visits with PCP. Return in 6 months to monitor.    Complete documentation of historical and exam elements from today's encounter can be found in the full encounter summary report (not reduplicated in this progress note). I personally obtained the chief complaint(s) and history of present illness. I confirmed and  edited as necessary the review of systems, past medical/surgical history, family history, social history, and examination findings as document by others; and I examined the patient myself. I personally reviewed the relevant tests, images, and reports as documented above. I formulated and edited as necessary the assessment and plan and discussed the findings and management plan with the patient and family.    Deanna Escalante OD             Again, thank you for allowing me to participate in the care of your patient.        Sincerely,        Deanna Escalante OD    Electronically signed

## 2025-03-12 NOTE — PROGRESS NOTES
Chief Complaint   Patient presents with    Diabetic Eye Exam     Accompanied by son who is interpreting   Chief Complaint(s) and History of Present Illness(es)       Diabetic Eye Exam              Diabetes Type: Type 2 and on insulin    Blood Sugars: is controlled                   Lab Results   Component Value Date    A1C 11.1 01/20/2025            Last Eye Exam: 2018 or 2019  Dilated Previously: Yes    What are you currently using to see?  Had readers for a while but has since been misplaced.     Distance Vision Acuity: Satisfied with vision    Near Vision Acuity: Not satisfied     Eye Comfort: good  Do you use eye drops? : No      Denelle Rebecca - Optometric Assistant     Medical, surgical and family histories reviewed and updated 3/12/2025.       OBJECTIVE: See Ophthalmology exam    ASSESSMENT:    ICD-10-CM    1. Type 2 diabetes mellitus with stage 3b chronic kidney disease, with long-term current use of insulin (H)  E11.22 EYE EXAM (SIMPLE-NONBILLABLE)    N18.32     Z79.4           PLAN:    Diana Quezada aware  eye exam results will be sent to [facility].  Patient Instructions   T2DM: Mild-moderate non-proliferative diabetic retinopathy with macular edema right>left.  A1c 11.1, patient will return for refraction once blood sugar is stabilized.  Patient educated on condition. Stressed importance of close BS/BP monitoring, diet/exercise, medication compliance, and regular visits with PCP. Return in 6 months to monitor.    Complete documentation of historical and exam elements from today's encounter can be found in the full encounter summary report (not reduplicated in this progress note). I personally obtained the chief complaint(s) and history of present illness. I confirmed and edited as necessary the review of systems, past medical/surgical history, family history, social history, and examination findings as document by others; and I examined the patient myself. I personally reviewed the relevant tests,  images, and reports as documented above. I formulated and edited as necessary the assessment and plan and discussed the findings and management plan with the patient and family.    Deanna Escalante, OD

## 2025-03-12 NOTE — PATIENT INSTRUCTIONS
T2DM: Mild-moderate non-proliferative diabetic retinopathy with macular edema right>left.  A1c 11.1, patient will return for refraction once blood sugar is stabilized.  Patient educated on condition. Stressed importance of close BS/BP monitoring, diet/exercise, medication compliance, and regular visits with PCP. Return in 6 months to monitor.

## 2025-03-22 ENCOUNTER — TELEPHONE (OUTPATIENT)
Dept: FAMILY MEDICINE | Facility: OTHER | Age: 60
End: 2025-03-22
Payer: MEDICARE

## 2025-03-22 ENCOUNTER — NURSE TRIAGE (OUTPATIENT)
Dept: NURSING | Facility: CLINIC | Age: 60
End: 2025-03-22
Payer: MEDICARE

## 2025-03-22 DIAGNOSIS — Z79.4 TYPE 2 DIABETES MELLITUS WITH STAGE 3B CHRONIC KIDNEY DISEASE, WITH LONG-TERM CURRENT USE OF INSULIN (H): Primary | ICD-10-CM

## 2025-03-22 DIAGNOSIS — E11.22 TYPE 2 DIABETES MELLITUS WITH STAGE 3B CHRONIC KIDNEY DISEASE, WITH LONG-TERM CURRENT USE OF INSULIN (H): Primary | ICD-10-CM

## 2025-03-22 DIAGNOSIS — N18.32 TYPE 2 DIABETES MELLITUS WITH STAGE 3B CHRONIC KIDNEY DISEASE, WITH LONG-TERM CURRENT USE OF INSULIN (H): Primary | ICD-10-CM

## 2025-03-22 NOTE — TELEPHONE ENCOUNTER
Nurse Triage SBAR    Is this a 2nd Level Triage? NO    Situation:   Spoke with son Timothy about 59 yr old Catiaa.  Patient gave verbal permission to speak with Timothy about his medical care.    Needs refill of diabetic needles.    Apparently needles did not come with last refill of Humalog -Kwikpen 100 unit/ml or Lantus insulin  Current .    Background:   Diabetes Type 2.      Assessment:   Pharmacy clarification needed.    Protocol Recommended Disposition:   Call Pharmacist Within 24 Hours    Recommendation:   Phone call to pharmacy for clarification of pen needles.  Spoke with Alta Frye.  There has not been a previous order for needles in the past.  Needles are available to buy behind the counter for box of 50 at $10.00.    If Rx.  They would need 31 gauge 5/8 inch pen needles.    Spoke with son and informed him of pharmacist response.  Son intends to go to [facility] and purchase needles today.      Routed to provider    Does the patient meet one of the following criteria for ADS visit consideration? No    Yuki Oswald RN  [facility]    Reason for Disposition   [1] Caller has medicine question about med NOT prescribed by PCP AND [2] triager unable to answer question (e.g., compatibility with other med, storage)    Additional Information   Negative: [1] Intentional drug overdose AND [2] suicidal thoughts or ideas   Negative: Drug overdose and triager unable to answer question   Negative: Caller requesting a renewal or refill of a medicine patient is currently taking   Negative: Caller requesting information unrelated to medicine   Negative: Caller requesting information about COVID-19 Vaccine   Negative: Caller requesting information about Emergency Contraception   Negative: Caller requesting information about Combined Birth Control Pills   Negative: Caller requesting information about Progestin Birth Control Pills   Negative: Caller requesting information about Post-Op pain or medicines   Negative:  Caller requesting a prescription antibiotic (such as Penicillin) for Strep throat and has a positive culture result   Negative: Caller requesting a prescription anti-viral med (such as Tamiflu) and has influenza (flu) symptoms   Negative: Immunization reaction suspected   Negative: Rash while taking a medicine or within 3 days of stopping it   Negative: [1] Asthma and [2] having symptoms of asthma (cough, wheezing, etc.)   Negative: [1] Symptom of illness (e.g., headache, abdominal pain, earache, vomiting) AND [2] more than mild   Negative: Breastfeeding questions about mother's medicines and diet   Negative: MORE THAN A DOUBLE DOSE of a prescription or over-the-counter (OTC) drug   Negative: [1] DOUBLE DOSE (an extra dose or lesser amount) of prescription drug AND [2] any symptoms (e.g., dizziness, nausea, pain, sleepiness)   Negative: [1] DOUBLE DOSE (an extra dose or lesser amount) of over-the-counter (OTC) drug AND [2] any symptoms (e.g., dizziness, nausea, pain, sleepiness)   Negative: Took another person's prescription drug   Negative: [1] DOUBLE DOSE (an extra dose or lesser amount) of prescription drug AND [2] NO symptoms  (Exception: A double dose of antibiotics.)   Negative: Diabetes drug error or overdose (e.g., took wrong type of insulin or took extra dose)   Negative: [1] Prescription not at pharmacy AND [2] was prescribed by PCP recently (Exception: Triager has access to EMR and prescription is recorded there. Go to Home Care and confirm for pharmacy.)   Negative: [1] Pharmacy calling with prescription question AND [2] triager unable to answer question   Negative: [1] Caller has URGENT medicine question about med that PCP or specialist prescribed AND [2] triager unable to answer question   Negative: Medicine patch causing local rash or itching    Protocols used: Medication Question Call-A-

## 2025-03-22 NOTE — TELEPHONE ENCOUNTER
Medication Question or Refill    Contacts       Contact Date/Time Type Contact Phone/Fax    03/22/2025 01:02 PM CDT Phone (Incoming) Marichuy Quezada (Emergency Contact) [phone] (M)            What medication are you calling about (include dose and sig)?: Insulin Met[place]     Preferred Pharmacy:   [id number] [place] [address] [address]  [address] [facility] [address]  Phone: [phone] Fax: [phone]      Controlled Substance Agreement on file:   CSA -- Patient Level:    CSA: None found at the patient level.       Who prescribed the medication?: PCP    Do you need a refill? Yes    When did you use the medication last? 1 month ago     Patient offered an appointment? No    Do you have any questions or concerns?  Yes: Patient has been out for a month        Could we send this information to you in One-Song or would you prefer to receive a phone call?:   Patient would like to be contacted via One-[employer]

## 2025-03-24 ENCOUNTER — OFFICE VISIT (OUTPATIENT)
Dept: AUDIOLOGY | Facility: CLINIC | Age: 60
End: 2025-03-24
Payer: MEDICARE

## 2025-03-24 ENCOUNTER — OFFICE VISIT (OUTPATIENT)
Dept: OTOLARYNGOLOGY | Facility: CLINIC | Age: 60
End: 2025-03-24
Payer: MEDICARE

## 2025-03-24 VITALS
SYSTOLIC BLOOD PRESSURE: 138 MMHG | HEIGHT: 60 IN | TEMPERATURE: 97.4 F | HEART RATE: 82 BPM | BODY MASS INDEX: 23.75 KG/M2 | WEIGHT: 121 LBS | DIASTOLIC BLOOD PRESSURE: 82 MMHG

## 2025-03-24 DIAGNOSIS — H90.3 SENSORINEURAL HEARING LOSS, BILATERAL: ICD-10-CM

## 2025-03-24 DIAGNOSIS — H90.6 MIXED CONDUCTIVE AND SENSORINEURAL HEARING LOSS OF BOTH EARS: Primary | ICD-10-CM

## 2025-03-24 DIAGNOSIS — H61.23 BILATERAL IMPACTED CERUMEN: Primary | ICD-10-CM

## 2025-03-24 PROCEDURE — 3079F DIAST BP 80-89 MM HG: CPT | Performed by: OTOLARYNGOLOGY

## 2025-03-24 PROCEDURE — 92567 TYMPANOMETRY: CPT

## 2025-03-24 PROCEDURE — 99203 OFFICE O/P NEW LOW 30 MIN: CPT | Mod: 25 | Performed by: OTOLARYNGOLOGY

## 2025-03-24 PROCEDURE — G0268 REMOVAL OF IMPACTED WAX MD: HCPCS | Performed by: OTOLARYNGOLOGY

## 2025-03-24 PROCEDURE — 92555 SPEECH THRESHOLD AUDIOMETRY: CPT

## 2025-03-24 PROCEDURE — 92553 AUDIOMETRY AIR & BONE: CPT

## 2025-03-24 PROCEDURE — 1126F AMNT PAIN NOTED NONE PRSNT: CPT | Performed by: OTOLARYNGOLOGY

## 2025-03-24 PROCEDURE — 3075F SYST BP GE 130 - 139MM HG: CPT | Performed by: OTOLARYNGOLOGY

## 2025-03-24 PROCEDURE — T1013 SIGN LANG/ORAL INTERPRETER: HCPCS | Mod: GT

## 2025-03-24 RX ORDER — INSULIN ASPART 100 [IU]/ML
INJECTION, SOLUTION INTRAVENOUS; SUBCUTANEOUS
COMMUNITY

## 2025-03-24 RX ORDER — IPRATROPIUM BROMIDE AND ALBUTEROL 20; 100 UG/1; UG/1
SPRAY, METERED RESPIRATORY (INHALATION)
COMMUNITY

## 2025-03-24 ASSESSMENT — PAIN SCALES - GENERAL: PAINLEVEL_OUTOF10: NO PAIN (0)

## 2025-03-24 NOTE — PROGRESS NOTES
"ENT Consultation    Diana Quezada who is a 59 year old male seen in consultation at the request of audiologist.      History of Present Illness - Diana Quezada is a 59 year old male with a history of significant hearing loss wears hearing aids.  He will get his hearing aids in [place] apparently quite powerful once couple months ago.  However he cannot wear both at the same time to produce too much \"noise\".  Patient is using  today to have a conversation.      Body mass index is 25.29 kg/m .    Weight management plan: Patient was referred to their PCP to discuss a diet and exercise plan.    BP Readings from Last 1 Encounters:   03/24/25 138/82       BP noted to be well controlled today in office.     Diana IS NOT a smoker/uses chewing tobacco.  Diana       Past Medical History -   Past Medical History:   Diagnosis Date    Diabetes (H)     Hypertension        Current Medications -   Current Outpatient Medications:     allopurinol (ZYLOPRIM) 100 MG tablet, Take 2 tablets (200 mg) by mouth daily., Disp: 180 tablet, Rfl: 3    aspirin 81 MG EC tablet, Take 1 tablet (81 mg) by mouth daily., Disp: 90 tablet, Rfl: 3    atorvastatin (LIPITOR) 80 MG tablet, Take 1 tablet (80 mg) by mouth at bedtime., Disp: 90 tablet, Rfl: 3    benazepril (LOTENSIN) 10 MG tablet, Take 1 tablet (10 mg) by mouth daily., Disp: 90 tablet, Rfl: 3    Continuous Glucose Sensor (FREESTYLE ELPIDIO 2 SENSOR) [facility], Change every 14 days., Disp: 6 each, Rfl: 1    empagliflozin (JARDIANCE) 10 MG TABS tablet, Take 1 tablet (10 mg) by mouth daily., Disp: 90 tablet, Rfl: 1    escitalopram (LEXAPRO) 10 MG tablet, Take 1.5 tablets (15 mg) by mouth daily., Disp: 135 tablet, Rfl: 3    ezetimibe (ZETIA) 10 MG tablet, Take 1 tablet (10 mg) by mouth daily., Disp: 90 tablet, Rfl: 3    gabapentin (NEURONTIN) 300 MG capsule, Take 1 capsule (300 mg) by mouth at bedtime., Disp: 90 capsule, Rfl: 3    HUMALOG KWIKPEN 100 UNIT/ML soln, Inject 15 Units subcutaneously 3 times " daily (before meals)., Disp: 30 mL, Rfl: 1    LANTUS SOLOSTAR 100 UNIT/ML soln, Inject 35 Units subcutaneously at bedtime., Disp: 30 mL, Rfl: 1    levothyroxine (SYNTHROID/LEVOTHROID) 150 MCG tablet, Take 1 tablet (150 mcg) by mouth every morning (before breakfast)., Disp: 90 tablet, Rfl: 1    Vitamin D3 (VITAMIN D-1000 MAX ST) 25 mcg (1000 units) tablet, Take 2,000 Units by mouth daily., Disp: , Rfl:     Allergies -   Allergies   Allergen Reactions    Acetaminophen-Codeine Other (See Comments)     Tired and sleepy       Social History -   Social History     Socioeconomic History    Marital status: Legally    Tobacco Use    Smoking status: Never    Smokeless tobacco: Never   Vaping Use    Vaping status: Never Used     Social Drivers of Health     Financial Resource Strain: Low Risk  (1/20/2025)    Financial Resource Strain     Within the past 12 months, have you or your family members you live with been unable to get utilities (heat, electricity) when it was really needed?: No   Food Insecurity: High Risk (1/20/2025)    Food Insecurity     Within the past 12 months, did you worry that your food would run out before you got money to buy more?: No     Within the past 12 months, did the food you bought just not last and you didn t have money to get more?: Yes   Transportation Needs: Low Risk  (1/20/2025)    Transportation Needs     Within the past 12 months, has lack of transportation kept you from medical appointments, getting your medicines, non-medical meetings or appointments, work, or from getting things that you need?: No    Received from [facility]    Social Connections   Interpersonal Safety: Not At Risk (1/1/2025)    [facility]    Intimate Partner Violence     Are you in a relationship where you are physically hurt, threatened and/or made to feel afraid?: No   Housing Stability: Low Risk  (1/20/2025)    Housing Stability     Do you  "have housing? : Yes     Are you worried about losing your housing?: No       Family History - History reviewed. No pertinent family history.    Review of Systems - As per HPI and PMHx, otherwise review of system review of the head and neck negative. Otherwise 10+ review of system is negative    Physical Exam  /82 (BP Location: Right arm, Patient Position: Sitting, Cuff Size: Adult Regular)   Pulse 82   Temp 97.4  F (36.3  C) (Temporal)   Ht 1.473 m (4' 10\")   Wt 54.9 kg (121 lb)   BMI 25.29 kg/m    BMI: Body mass index is 25.29 kg/m .    General - The patient is well nourished and well developed, and appears to have good nutritional status.  Alert and oriented to person and place, answers questions and cooperates with examination appropriately.    SKIN - No suspicious lesions or rashes.  Respiration - No respiratory distress.  Head and Face - Normocephalic and atraumatic, with no gross asymmetry noted of the contour of the facial features.  The facial nerve is intact, with strong symmetric movements.    Voice and Breathing - The patient was breathing comfortably without the use of accessory muscles. The patients voice was clear and strong, and had appropriate pitch and quality.    Ears - Bilateral pinna and EACs with normal appearing overlying skin. Tympanic membrane intact with good mobility on pneumatic otoscopy bilaterally. Bony landmarks of the ossicular chain are normal. The tympanic membranes are normal in appearance. No retraction, perforation, or masses.  No fluid or purulence was seen in the external canal or the middle ear.     Eyes - Extraocular movements intact.  Sclera were not icteric or injected, conjunctiva were pink and moist.    Mouth - Examination of the oral cavity showed pink, healthy oral mucosa. No lesions or ulcerations noted.  The tongue was mobile and midline, and the dentition were in good condition.      Throat - The walls of the oropharynx were smooth, pink, moist, symmetric, " and had no lesions or ulcerations.  The tonsillar pillars and soft palate were symmetric.  The uvula was midline on elevation.    Neck - Normal midline excursion of the laryngotracheal complex during swallowing.  Full range of motion on passive movement.  Palpation of the occipital, submental, submandibular, internal jugular chain, and supraclavicular nodes did not demonstrate any abnormal lymph nodes or masses.  The carotid pulse was palpable bilaterally.  Palpation of the thyroid was soft and smooth, with no nodules or goiter appreciated.  The trachea was mobile and midline.    Nose - External contour is symmetric, no gross deflection or scars.  Nasal mucosa is pink and moist with no abnormal mucus.  The septum was midline and non-obstructive, turbinates of normal size and position.  No polyps, masses, or purulence noted on examination.    Neuro - Nonfocal neuro exam is normal, CN 2 through 12 intact, normal gait and muscle tone.      Performed in clinic today:  Cerumen disimpaction was performed first.  No significant amount of cerumen bilaterally especially on the left side.  I used cerumen curette I used suction to carefully remove cerumen bilaterally.  Both tympanic membranes appear to be clear and mobile.    Audiogram was performed that showed relatively flat tympanograms type B.  However there was severe sensorineural hearing loss involving both ears very symmetric.  Word recognition score could not be performed because of language barrier.    A/P - Diana Quezada is a 59 year old male with severe sensorineural hearing loss bilaterally.  Patient will work with audiology to try and properly optimize his hearing aid use.  Will follow him in a year.      Rj Edge MD

## 2025-03-24 NOTE — LETTER
"3/24/2025      Diana Quezada  [address] [facility] [id number]      Dear Colleague,    Thank you for referring your patient, Diana Quezada, to the [facility]. Please see a copy of my visit note below.    ENT Consultation    Diana Quezada who is a 59 year old male seen in consultation at the request of audiologist.      History of Present Illness - Diana Quezada is a 59 year old male with a history of significant hearing loss wears hearing aids.  He will get his hearing aids in [place] apparently quite powerful once couple months ago.  However he cannot wear both at the same time to produce too much \"noise\".  Patient is using  today to have a conversation.      Body mass index is 25.29 kg/m .    Weight management plan: Patient was referred to their PCP to discuss a diet and exercise plan.    BP Readings from Last 1 Encounters:   03/24/25 138/82       BP noted to be well controlled today in office.     Diana IS NOT a smoker/uses chewing tobacco.  Diana       Past Medical History -   Past Medical History:   Diagnosis Date     Diabetes (H)      Hypertension        Current Medications -   Current Outpatient Medications:      allopurinol (ZYLOPRIM) 100 MG tablet, Take 2 tablets (200 mg) by mouth daily., Disp: 180 tablet, Rfl: 3     aspirin 81 MG EC tablet, Take 1 tablet (81 mg) by mouth daily., Disp: 90 tablet, Rfl: 3     atorvastatin (LIPITOR) 80 MG tablet, Take 1 tablet (80 mg) by mouth at bedtime., Disp: 90 tablet, Rfl: 3     benazepril (LOTENSIN) 10 MG tablet, Take 1 tablet (10 mg) by mouth daily., Disp: 90 tablet, Rfl: 3     Continuous Glucose Sensor (FREESTYLE ELPIDIO 2 SENSOR) [facility], Change every 14 days., Disp: 6 each, Rfl: 1     empagliflozin (JARDIANCE) 10 MG TABS tablet, Take 1 tablet (10 mg) by mouth daily., Disp: 90 tablet, Rfl: 1     escitalopram (LEXAPRO) 10 MG tablet, Take 1.5 tablets (15 mg) by mouth daily., Disp: 135 tablet, Rfl: 3     ezetimibe (ZETIA) 10 MG tablet, Take 1 tablet (10 mg) " by mouth daily., Disp: 90 tablet, Rfl: 3     gabapentin (NEURONTIN) 300 MG capsule, Take 1 capsule (300 mg) by mouth at bedtime., Disp: 90 capsule, Rfl: 3     HUMALOG KWIKPEN 100 UNIT/ML soln, Inject 15 Units subcutaneously 3 times daily (before meals)., Disp: 30 mL, Rfl: 1     LANTUS SOLOSTAR 100 UNIT/ML soln, Inject 35 Units subcutaneously at bedtime., Disp: 30 mL, Rfl: 1     levothyroxine (SYNTHROID/LEVOTHROID) 150 MCG tablet, Take 1 tablet (150 mcg) by mouth every morning (before breakfast)., Disp: 90 tablet, Rfl: 1     Vitamin D3 (VITAMIN D-1000 MAX ST) 25 mcg (1000 units) tablet, Take 2,000 Units by mouth daily., Disp: , Rfl:     Allergies -   Allergies   Allergen Reactions     Acetaminophen-Codeine Other (See Comments)     Tired and sleepy       Social History -   Social History     Socioeconomic History     Marital status: Legally    Tobacco Use     Smoking status: Never     Smokeless tobacco: Never   Vaping Use     Vaping status: Never Used     Social Drivers of Health     Financial Resource Strain: Low Risk  (1/20/2025)    Financial Resource Strain      Within the past 12 months, have you or your family members you live with been unable to get utilities (heat, electricity) when it was really needed?: No   Food Insecurity: High Risk (1/20/2025)    Food Insecurity      Within the past 12 months, did you worry that your food would run out before you got money to buy more?: No      Within the past 12 months, did the food you bought just not last and you didn t have money to get more?: Yes   Transportation Needs: Low Risk  (1/20/2025)    Transportation Needs      Within the past 12 months, has lack of transportation kept you from medical appointments, getting your medicines, non-medical meetings or appointments, work, or from getting things that you need?: No    Received from [facility] & [facility]    Social Connections   Interpersonal Safety: Not At Risk (1/1/2025)    Received  "from [facility] and [facility]    Intimate Partner Violence      Are you in a relationship where you are physically hurt, threatened and/or made to feel afraid?: No   Housing Stability: Low Risk  (1/20/2025)    Housing Stability      Do you have housing? : Yes      Are you worried about losing your housing?: No       Family History - History reviewed. No pertinent family history.    Review of Systems - As per HPI and PMHx, otherwise review of system review of the head and neck negative. Otherwise 10+ review of system is negative    Physical Exam  /82 (BP Location: Right arm, Patient Position: Sitting, Cuff Size: Adult Regular)   Pulse 82   Temp 97.4  F (36.3  C) (Temporal)   Ht 1.473 m (4' 10\")   Wt 54.9 kg (121 lb)   BMI 25.29 kg/m    BMI: Body mass index is 25.29 kg/m .    General - The patient is well nourished and well developed, and appears to have good nutritional status.  Alert and oriented to person and place, answers questions and cooperates with examination appropriately.    SKIN - No suspicious lesions or rashes.  Respiration - No respiratory distress.  Head and Face - Normocephalic and atraumatic, with no gross asymmetry noted of the contour of the facial features.  The facial nerve is intact, with strong symmetric movements.    Voice and Breathing - The patient was breathing comfortably without the use of accessory muscles. The patients voice was clear and strong, and had appropriate pitch and quality.    Ears - Bilateral pinna and EACs with normal appearing overlying skin. Tympanic membrane intact with good mobility on pneumatic otoscopy bilaterally. Bony landmarks of the ossicular chain are normal. The tympanic membranes are normal in appearance. No retraction, perforation, or masses.  No fluid or purulence was seen in the external canal or the middle ear.     Eyes - Extraocular movements intact.  Sclera were not icteric or injected, conjunctiva were pink and moist.    Mouth - " Examination of the oral cavity showed pink, healthy oral mucosa. No lesions or ulcerations noted.  The tongue was mobile and midline, and the dentition were in good condition.      Throat - The walls of the oropharynx were smooth, pink, moist, symmetric, and had no lesions or ulcerations.  The tonsillar pillars and soft palate were symmetric.  The uvula was midline on elevation.    Neck - Normal midline excursion of the laryngotracheal complex during swallowing.  Full range of motion on passive movement.  Palpation of the occipital, submental, submandibular, internal jugular chain, and supraclavicular nodes did not demonstrate any abnormal lymph nodes or masses.  The carotid pulse was palpable bilaterally.  Palpation of the thyroid was soft and smooth, with no nodules or goiter appreciated.  The trachea was mobile and midline.    Nose - External contour is symmetric, no gross deflection or scars.  Nasal mucosa is pink and moist with no abnormal mucus.  The septum was midline and non-obstructive, turbinates of normal size and position.  No polyps, masses, or purulence noted on examination.    Neuro - Nonfocal neuro exam is normal, CN 2 through 12 intact, normal gait and muscle tone.      Performed in clinic today:  Cerumen disimpaction was performed first.  No significant amount of cerumen bilaterally especially on the left side.  I used cerumen curette I used suction to carefully remove cerumen bilaterally.  Both tympanic membranes appear to be clear and mobile.    Audiogram was performed that showed relatively flat tympanograms type B.  However there was severe sensorineural hearing loss involving both ears very symmetric.  Word recognition score could not be performed because of language barrier.    A/P - Diana Quezada is a 59 year old male with severe sensorineural hearing loss bilaterally.  Patient will work with audiology to try and properly optimize his hearing aid use.  Will follow him in a year.      Rj  MD Kely       Again, thank you for allowing me to participate in the care of your patient.        Sincerely,        Rj Edge MD, MD    Electronically signed

## 2025-04-14 NOTE — PROGRESS NOTES
AUDIOLOGY REPORT    SUBJECTIVE: Diana Quezada is a 59 year old male was seen in the Audiology Clinic at  [facility] on 4/21/25 to discuss concerns with hearing and functional communication difficulties. The patient was accompanied by their son-in-law. Yes: Language: Richardson (video), ID Number/Identifier: [id number]. Diana has been seen previously on 3/24/2025, and results revealed a bilateral mixed hearing loss. The patient was medically evaluated and determined to be cleared for binaural hearing aids by Rj Edge MD. Diana notes difficulty with communication in a variety of listening situations.    OBJECTIVE:  Patient is a hearing aid candidate. Patient would like to move forward with a hearing aid evaluation today. Therefore, the patient was presented with different options for amplification to help aid in communication. Discussed styles, levels of technology and monaural vs. binaural fitting.     The hearing aid(s) mutually chosen were:  Binaural: Markus Edge AI 24 ITE  MA # [id number]  COLOR: pink  BATTERY SIZE: rechargeable  EARMOLD/TIPS: full shell  CANAL/ LENGTH: medium     Otoscopy revealed ears are clear of cerumen bilaterally. Bilateral earmolds were taken without incident.    ASSESSMENT:   Reviewed purchase information and warranty information with patient. The 45 day trial period was explained to patient. The patient was given a copy of the [facility] consumer brochure on purchasing hearing instruments. Patient risk factors have been provided to the patient in writing prior to the sale of the hearing aid per FDA regulation. The risk factors are also available in the User Instructional Booklet to be presented on the day of the hearing aid fitting. Hearing aid(s) ordered. Hearing aid evaluation completed.    PLAN: Diana is scheduled to return in 2-3 weeks for a hearing aid fitting and programming. Purchase agreement will be completed on that date. Please contact  this clinic with any questions or concerns.      Cuca Sifuentes, CCC-A  Doctor of Audiology, [place] #[id number]   April 21, 2025

## 2025-04-16 ENCOUNTER — NURSE TRIAGE (OUTPATIENT)
Dept: FAMILY MEDICINE | Facility: OTHER | Age: 60
End: 2025-04-16

## 2025-04-16 ENCOUNTER — HOSPITAL ENCOUNTER (EMERGENCY)
Facility: CLINIC | Age: 60
Discharge: HOME | End: 2025-04-16
Attending: EMERGENCY MEDICINE | Admitting: EMERGENCY MEDICINE
Payer: MEDICARE

## 2025-04-16 VITALS
OXYGEN SATURATION: 98 % | SYSTOLIC BLOOD PRESSURE: 140 MMHG | WEIGHT: 115 LBS | TEMPERATURE: 98 F | RESPIRATION RATE: 18 BRPM | BODY MASS INDEX: 24.04 KG/M2 | DIASTOLIC BLOOD PRESSURE: 83 MMHG | HEART RATE: 73 BPM

## 2025-04-16 DIAGNOSIS — K59.00 CONSTIPATION, UNSPECIFIED CONSTIPATION TYPE: ICD-10-CM

## 2025-04-16 DIAGNOSIS — E11.65 TYPE 2 DIABETES MELLITUS WITH HYPERGLYCEMIA, WITH LONG-TERM CURRENT USE OF INSULIN (H): ICD-10-CM

## 2025-04-16 DIAGNOSIS — Z79.4 TYPE 2 DIABETES MELLITUS WITH HYPERGLYCEMIA, WITH LONG-TERM CURRENT USE OF INSULIN (H): ICD-10-CM

## 2025-04-16 LAB
ALBUMIN SERPL BCG-MCNC: 4.3 G/DL (ref 3.5–5.2)
ALBUMIN UR-MCNC: NEGATIVE MG/DL
ALP SERPL-CCNC: 208 U/L (ref 40–150)
ALT SERPL W P-5'-P-CCNC: 241 U/L (ref 0–70)
ANION GAP SERPL CALCULATED.3IONS-SCNC: 13 MMOL/L (ref 7–15)
APPEARANCE UR: CLEAR
AST SERPL W P-5'-P-CCNC: 96 U/L (ref 0–45)
B-OH-BUTYR SERPL-SCNC: <0.18 MMOL/L
BASE EXCESS BLDV CALC-SCNC: 1.4 MMOL/L (ref -3–3)
BASOPHILS # BLD AUTO: 0 10E3/UL (ref 0–0.2)
BASOPHILS NFR BLD AUTO: 1 %
BILIRUB SERPL-MCNC: 0.4 MG/DL
BILIRUB UR QL STRIP: NEGATIVE
BUN SERPL-MCNC: 30.7 MG/DL (ref 8–23)
CALCIUM SERPL-MCNC: 9.5 MG/DL (ref 8.8–10.4)
CHLORIDE SERPL-SCNC: 101 MMOL/L (ref 98–107)
COLOR UR AUTO: ABNORMAL
CREAT SERPL-MCNC: 1.63 MG/DL (ref 0.67–1.17)
EGFRCR SERPLBLD CKD-EPI 2021: 48 ML/MIN/1.73M2
EOSINOPHIL # BLD AUTO: 0.1 10E3/UL (ref 0–0.7)
EOSINOPHIL NFR BLD AUTO: 2 %
ERYTHROCYTE [DISTWIDTH] IN BLOOD BY AUTOMATED COUNT: 14.1 % (ref 10–15)
FLUAV RNA SPEC QL NAA+PROBE: NEGATIVE
FLUBV RNA RESP QL NAA+PROBE: NEGATIVE
GLUCOSE BLDC GLUCOMTR-MCNC: 421 MG/DL (ref 70–99)
GLUCOSE SERPL-MCNC: 378 MG/DL (ref 70–99)
GLUCOSE UR STRIP-MCNC: >1000 MG/DL
HCO3 BLDV-SCNC: 28 MMOL/L (ref 21–28)
HCO3 SERPL-SCNC: 24 MMOL/L (ref 22–29)
HCT VFR BLD AUTO: 44.2 % (ref 40–53)
HGB BLD-MCNC: 15.6 G/DL (ref 13.3–17.7)
HGB UR QL STRIP: NEGATIVE
IMM GRANULOCYTES # BLD: 0 10E3/UL
IMM GRANULOCYTES NFR BLD: 1 %
KETONES UR STRIP-MCNC: NEGATIVE MG/DL
LACTATE SERPL-SCNC: 1.7 MMOL/L (ref 0.7–2)
LEUKOCYTE ESTERASE UR QL STRIP: NEGATIVE
LIPASE SERPL-CCNC: 60 U/L (ref 13–60)
LYMPHOCYTES # BLD AUTO: 1.6 10E3/UL (ref 0.8–5.3)
LYMPHOCYTES NFR BLD AUTO: 28 %
MAGNESIUM SERPL-MCNC: 2 MG/DL (ref 1.7–2.3)
MCH RBC QN AUTO: 28.9 PG (ref 26.5–33)
MCHC RBC AUTO-ENTMCNC: 35.3 G/DL (ref 31.5–36.5)
MCV RBC AUTO: 82 FL (ref 78–100)
MONOCYTES # BLD AUTO: 0.4 10E3/UL (ref 0–1.3)
MONOCYTES NFR BLD AUTO: 7 %
MUCOUS THREADS #/AREA URNS LPF: PRESENT /LPF
NEUTROPHILS # BLD AUTO: 3.6 10E3/UL (ref 1.6–8.3)
NEUTROPHILS NFR BLD AUTO: 62 %
NITRATE UR QL: NEGATIVE
NRBC # BLD AUTO: 0 10E3/UL
NRBC BLD AUTO-RTO: 0 /100
O2/TOTAL GAS SETTING VFR VENT: 21 %
OXYHGB MFR BLDV: 48 % (ref 70–75)
PCO2 BLDV: 51 MM HG (ref 40–50)
PH BLDV: 7.35 [PH] (ref 7.32–7.43)
PH UR STRIP: 5.5 [PH] (ref 5–7)
PHOSPHATE SERPL-MCNC: 3.7 MG/DL (ref 2.5–4.5)
PLATELET # BLD AUTO: 139 10E3/UL (ref 150–450)
PO2 BLDV: 24 MM HG (ref 25–47)
POTASSIUM SERPL-SCNC: 4.7 MMOL/L (ref 3.4–5.3)
PROT SERPL-MCNC: 7.3 G/DL (ref 6.4–8.3)
RBC # BLD AUTO: 5.4 10E6/UL (ref 4.4–5.9)
RBC URINE: 0 /HPF
RSV RNA SPEC NAA+PROBE: NEGATIVE
SAO2 % BLDV: 48.8 % (ref 70–75)
SARS-COV-2 RNA RESP QL NAA+PROBE: NEGATIVE
SODIUM SERPL-SCNC: 138 MMOL/L (ref 135–145)
SP GR UR STRIP: 1.02 (ref 1–1.03)
UROBILINOGEN UR STRIP-MCNC: NORMAL MG/DL
WBC # BLD AUTO: 5.7 10E3/UL (ref 4–11)
WBC URINE: <1 /HPF

## 2025-04-16 PROCEDURE — 81001 URINALYSIS AUTO W/SCOPE: CPT | Performed by: EMERGENCY MEDICINE

## 2025-04-16 PROCEDURE — 83690 ASSAY OF LIPASE: CPT | Performed by: EMERGENCY MEDICINE

## 2025-04-16 PROCEDURE — 36415 COLL VENOUS BLD VENIPUNCTURE: CPT | Performed by: EMERGENCY MEDICINE

## 2025-04-16 PROCEDURE — 84100 ASSAY OF PHOSPHORUS: CPT | Performed by: EMERGENCY MEDICINE

## 2025-04-16 PROCEDURE — 83930 ASSAY OF BLOOD OSMOLALITY: CPT | Performed by: EMERGENCY MEDICINE

## 2025-04-16 PROCEDURE — 85004 AUTOMATED DIFF WBC COUNT: CPT | Performed by: EMERGENCY MEDICINE

## 2025-04-16 PROCEDURE — 83735 ASSAY OF MAGNESIUM: CPT | Performed by: EMERGENCY MEDICINE

## 2025-04-16 PROCEDURE — 99285 EMERGENCY DEPT VISIT HI MDM: CPT | Performed by: EMERGENCY MEDICINE

## 2025-04-16 PROCEDURE — 250N000013 HC RX MED GY IP 250 OP 250 PS 637: Performed by: EMERGENCY MEDICINE

## 2025-04-16 PROCEDURE — 83605 ASSAY OF LACTIC ACID: CPT | Performed by: EMERGENCY MEDICINE

## 2025-04-16 PROCEDURE — 96360 HYDRATION IV INFUSION INIT: CPT | Performed by: EMERGENCY MEDICINE

## 2025-04-16 PROCEDURE — 82310 ASSAY OF CALCIUM: CPT | Performed by: EMERGENCY MEDICINE

## 2025-04-16 PROCEDURE — 258N000003 HC RX IP 258 OP 636: Performed by: EMERGENCY MEDICINE

## 2025-04-16 PROCEDURE — 82962 GLUCOSE BLOOD TEST: CPT

## 2025-04-16 PROCEDURE — 84460 ALANINE AMINO (ALT) (SGPT): CPT | Performed by: EMERGENCY MEDICINE

## 2025-04-16 PROCEDURE — 96361 HYDRATE IV INFUSION ADD-ON: CPT | Performed by: EMERGENCY MEDICINE

## 2025-04-16 PROCEDURE — 250N000011 HC RX IP 250 OP 636: Performed by: EMERGENCY MEDICINE

## 2025-04-16 PROCEDURE — 81003 URINALYSIS AUTO W/O SCOPE: CPT | Performed by: EMERGENCY MEDICINE

## 2025-04-16 PROCEDURE — 87637 SARSCOV2&INF A&B&RSV AMP PRB: CPT | Performed by: EMERGENCY MEDICINE

## 2025-04-16 PROCEDURE — 82010 KETONE BODYS QUAN: CPT | Performed by: EMERGENCY MEDICINE

## 2025-04-16 PROCEDURE — 99285 EMERGENCY DEPT VISIT HI MDM: CPT | Mod: 25 | Performed by: EMERGENCY MEDICINE

## 2025-04-16 PROCEDURE — 250N000009 HC RX 250: Performed by: EMERGENCY MEDICINE

## 2025-04-16 PROCEDURE — 82805 BLOOD GASES W/O2 SATURATION: CPT | Performed by: EMERGENCY MEDICINE

## 2025-04-16 RX ORDER — IOPAMIDOL 755 MG/ML
500 INJECTION, SOLUTION INTRAVASCULAR ONCE
Status: COMPLETED | OUTPATIENT
Start: 2025-04-16 | End: 2025-04-16

## 2025-04-16 RX ADMIN — SODIUM CHLORIDE 1000 ML: 0.9 INJECTION, SOLUTION INTRAVENOUS at 20:07

## 2025-04-16 RX ADMIN — SODIUM CHLORIDE 60 ML: 9 INJECTION, SOLUTION INTRAVENOUS at 19:35

## 2025-04-16 RX ADMIN — IOPAMIDOL 56 ML: 755 INJECTION, SOLUTION INTRAVENOUS at 19:35

## 2025-04-16 RX ADMIN — SODIUM CHLORIDE 1000 ML: 9 INJECTION, SOLUTION INTRAVENOUS at 19:02

## 2025-04-16 RX ADMIN — DOCUSATE SODIUM 226 ML: 50 LIQUID ORAL at 20:17

## 2025-04-16 ASSESSMENT — ACTIVITIES OF DAILY LIVING (ADL)
ADLS_ACUITY_SCORE: 43
ADLS_ACUITY_SCORE: 41

## 2025-04-16 ASSESSMENT — COLUMBIA-SUICIDE SEVERITY RATING SCALE - C-SSRS
6. HAVE YOU EVER DONE ANYTHING, STARTED TO DO ANYTHING, OR PREPARED TO DO ANYTHING TO END YOUR LIFE?: NO
1. IN THE PAST MONTH, HAVE YOU WISHED YOU WERE DEAD OR WISHED YOU COULD GO TO SLEEP AND NOT WAKE UP?: NO
2. HAVE YOU ACTUALLY HAD ANY THOUGHTS OF KILLING YOURSELF IN THE PAST MONTH?: NO

## 2025-04-16 NOTE — TELEPHONE ENCOUNTER
Nurse Triage SBAR    Is this a 2nd Level Triage? YES, LICENSED PRACTITIONER REVIEW IS REQUIRED    Situation: patient has virtual appointment today.  Daughter is present with patient. Patient has had memory loss. Mentioned in appointment with PCP in January 2025, Neurology referral placed.  C2C was signed per daughter but was not ever scanned in the chart. RN did take in information from daughter.  Daughter said at the appointment with PCP in January it was discussed that patient has had memory loss.  Daughter said patient has been more confused over the last 2 weeks, worse in the last 5 days.  Daughter denies patient that has been paranoid or a threat to himself or others. He is mumbling or talking to himself a lot more. He will have conversations with himself.  Patient and daughter had the same conversation 3 times today.  Daughter denies patient has had a fever or cold symptoms.  Daughter is also asking for a letter for her to be off of work for the next few weeks to be with patient until his next appointment with provider.     Background: increased confusion over last 2 weeks, more in the last 5 days.     Assessment: advised to be seen today, has virtual visit today.     Protocol Recommended Disposition:   See in Office Today or Tomorrow    Recommendation: RN advised daughter that RN would discuss with provider and call her back.     RN did huddle with PCP and reviewed triage, virtual appointment with him today.  Per JM, virtual appointment is not appropriate for patient today.  Patient may have some type of infectious origin that is causing increased confusion.  Patient should be seen in ED in [facility]. He will then be able to review ED visit and be able to do letter tomorrow.     RN did call daughter back and reviewed providers recommendations with her. She verbalized understanding and is agreeable. She will attempt to get C2C form redone while at ED.  She will touch base with staff regarding letter in  the next few days after provider has had time to review ED notes. Denies any other questions at this time.     Huddled with provider.     Does the patient meet one of the following criteria for ADS visit consideration? 16+ years old, with an [facility] PCP     TIP  Providers, please consider if this condition is appropriate for management at one of our Acute and Diagnostic Services sites.     If patient is a good candidate, please use dotphrase <dot>triageresponse and select Refer to ADS to document.        Reason for Disposition   Longstanding confusion (e.g., dementia, stroke) and worsening    Additional Information   Negative: Difficult to awaken or acting confused (disoriented, slurred speech) and has diabetes mellitus   Negative: Difficult to awaken or acting confused (disoriented, slurred speech) and new-onset   Negative: Weakness of the face, arm, or leg on one side of the body and new-onset   Negative: Numbness of the face, arm, or leg on one side of the body and new-onset   Negative: Loss of speech or garbled speech and new-onset   Negative: Difficulty breathing and bluish (or gray) lips or face   Negative: Shock suspected (e.g., cold/pale/clammy skin, too weak to stand, low BP, rapid pulse)   Negative: Seeing or hearing or feeling things that are not there (i.e., auditory, visual, or tactile hallucinations)   Negative: Followed a head injury   Negative: Drug overdose suspected   Negative: Violent behavior, or threatening to physically hurt or kill someone   Negative: Sounds like a life-threatening emergency to the triager   Negative: Questions or concerns about alcohol use, unhealthy alcohol use, binge drinking, intoxication, or withdrawal   Negative: Questions or concerns about substance use (drug use), unhealthy drug use, intoxication, or withdrawal   Negative: Diabetes mellitus and confusion from low blood sugar (i.e., < 60 mg/dl or 3.5 mmol/l)   Negative: Headache or vomiting   Negative: Stiff neck (can't  touch chin to chest)   Negative: Very strange or paranoid behavior   Negative: Fever > 100.4 F (38.0 C)   Negative: Patient sounds very sick or weak to the triager   Negative: Brief confusion (now gone)   Negative: Patient wants to be seen (or caregiver requests)    Protocols used: Confusion - Delirium-A-OH

## 2025-04-16 NOTE — ED TRIAGE NOTES
Patient with high blood sugar for the past 2 days, states he has been taking his meds and insulin. At night feels nauseated. [employer]  being used.     Triage Assessment (Adult)       Row Name 04/16/25 2554          Triage Assessment    Airway WDL WDL        Respiratory WDL    Respiratory WDL WDL        Skin Circulation/Temperature WDL    Skin Circulation/Temperature WDL WDL

## 2025-04-17 LAB — OSMOLALITY SERPL: 320 MMOL/KG (ref 280–301)

## 2025-04-17 NOTE — ED NOTES
PT given enema, commode at bedside, understands to hold the enema in for at least 15mins. Will call when he is finished.  used for communication, PT verbalized understanding.

## 2025-04-17 NOTE — ED PROVIDER NOTES
"  History     Chief Complaint   Patient presents with    Hyperglycemia     HPI  History per patient, family, medical records  ong     This is a 59-year-old male, history of type 2 diabetes on insulin, CKD, hypertension, hyperlipidemia, chronic tophaceous gout, hypothyroidism, COPD, chronic low back pain, depression presenting with hyperglycemia.  Patient has a Dexcom in place and has noted that his blood sugars have been high for about 2 days.  He has felt disoriented.  It has been fatigued, weak, dizzy.  He has nausea.  He is taking his insulin as prescribed apparently with no recent changes.  He denies any fever.  Has had some dyspnea on exertion when he does stairs.  No chest pain.  Occasional cough.  He has had a \"ulcer in his stomach\" with upper abdominal pain for \"a long time\".  Is been several days since he has had a bowel movement.  Urinating normally.  No history of abdominal surgeries.  He is not on any current steroids, no recent steroid injections.  He takes allopurinol daily for his gout.      Allergies:  Allergies   Allergen Reactions    Acetaminophen-Codeine Other (See Comments)     Tired and sleepy       Problem List:    Patient Active Problem List    Diagnosis Date Noted    Type 2 diabetes mellitus with stage 3b chronic kidney disease, with long-term current use of insulin (H) 01/20/2025     Priority: Medium    Benign essential hypertension 01/20/2025     Priority: Medium    Hyperlipidemia LDL goal <70 01/20/2025     Priority: Medium    Chronic tophaceous gout 01/20/2025     Priority: Medium    Hypothyroidism, unspecified type 01/20/2025     Priority: Medium    Mild recurrent major depression 01/20/2025     Priority: Medium    Chronic obstructive pulmonary disease, unspecified COPD type (H) 01/20/2025     Priority: Medium    Impaired mobility 01/20/2025     Priority: Medium    Chronic bilateral low back pain without sciatica 01/20/2025     Priority: Medium        Past Medical History:  "   Past Medical History:   Diagnosis Date    Diabetes (H)     Hypertension        Past Surgical History:    Past Surgical History:   Procedure Laterality Date    CATARACT IOL, RT/LT         Family History:    History reviewed. No pertinent family history.    Social History:  Marital Status:  Legally  [3]  Social History     Tobacco Use    Smoking status: Never     Passive exposure: Never    Smokeless tobacco: Never   Vaping Use    Vaping status: Never Used        Medications:    allopurinol (ZYLOPRIM) 100 MG tablet  aspirin 81 MG EC tablet  atorvastatin (LIPITOR) 80 MG tablet  benazepril (LOTENSIN) 10 MG tablet  Continuous Glucose Sensor (FREESTYLE ELPIDIO 2 SENSOR) MISC  empagliflozin (JARDIANCE) 10 MG TABS tablet  escitalopram (LEXAPRO) 10 MG tablet  ezetimibe (ZETIA) 10 MG tablet  gabapentin (NEURONTIN) 300 MG capsule  HUMALOG KWIKPEN 100 UNIT/ML soln  insulin aspart (NOVOLOG FLEXPEN) 100 UNIT/ML pen  insulin pen needle (31G X 5 MM) 31G X 5 MM miscellaneous  ipratropium-albuterol (COMBIVENT RESPIMAT)  MCG/ACT inhaler  LANTUS SOLOSTAR 100 UNIT/ML soln  levothyroxine (SYNTHROID/LEVOTHROID) 150 MCG tablet  Vitamin D3 (VITAMIN D-1000 MAX ST) 25 mcg (1000 units) tablet          Review of Systems  All other ROS reviewed and are negative or non-contributory except as stated in HPI.     Physical Exam   BP: 138/88  Pulse: 90  Temp: 98  F (36.7  C)  Resp: 18  Weight: 52.2 kg (115 lb)  SpO2: 98 %      Physical Exam  Vitals and nursing note reviewed.   Constitutional:       Comments: Very pleasant, healthy-appearing older  gentleman lying in the bed   HENT:      Head: Normocephalic.      Nose: Nose normal.      Mouth/Throat:      Pharynx: Oropharynx is clear.      Comments: Slightly tacky mucous membranes  Eyes:      General: No scleral icterus.     Extraocular Movements: Extraocular movements intact.      Conjunctiva/sclera: Conjunctivae normal.   Cardiovascular:      Rate and Rhythm: Normal rate and  regular rhythm.      Pulses: Normal pulses.      Heart sounds: Normal heart sounds.   Pulmonary:      Effort: Pulmonary effort is normal.      Breath sounds: Normal breath sounds.   Abdominal:      Palpations: Abdomen is soft.      Comments: Mild diffuse tenderness without mass or rebound   Musculoskeletal:      Cervical back: Normal range of motion and neck supple.      Comments: Gouty tophi at bilateral feet, ankles, right elbow.  No erythema   Skin:     General: Skin is warm and dry.      Coloration: Skin is not pale.      Findings: No rash.   Neurological:      General: No focal deficit present.      Mental Status: He is alert.      Comments: Hard of hearing   Psychiatric:         Mood and Affect: Mood normal.         Behavior: Behavior normal.         ED Course (with Medical Decision Making)    Pt seen and examined by me.  RN and EPIC notes reviewed.       Patient with type 2 diabetes, hyperglycemia.  Generally feeling unwell with nausea.  Concerns for constipation.  Point-of-care glucose 421    IV placed for fluids, labs.  CBC with normal white count, normal hemoglobin.  Platelets 139  Ketones negative  Lactic acid 1.7  VBG without acidosis.  7.3 5/51/24/28  Normal phosphorus and magnesium  CMP with elevated BUN/creatinine at 3.7/1.63.  Glucose 378.  LFTs elevated alk phosphatase 2 await, AST 96, .  Normal bilirubin.  Normal lipase  COVID/influenza/RSV negative  Urinalysis with elevated specific gravity and glucose.  No evidence for infection    In looking for possible underlying infectious or abnormal cause for his high sugars I did check CT scan.   no acute abnormality noted in the chest/abdomen/pelvis.  He has gallstones, mild splenomegaly.  SMA stenosis noted proximally.  Chronic appearing L1 compression deformity.    There was not a lot of stool but I did give the patient a pink lady enema.  He noted significant improvement in his abdominal discomfort after the enema.  He feels comfortable to  return home.  I recommend that he increase his Lantus 5 units.  I would have him follow-up with his primary care provider.  I did review chart and note unfortunately patient has had a high hemoglobin A1c at 11.1 in January but at that time had been off of his insulin.  I am going to have him closely follow-up in clinic.  Return at anytime for worsening, changes or concerns        Procedures    Results for orders placed or performed during the hospital encounter of 04/16/25 (from the past 24 hours)   Glucose by meter   Result Value Ref Range    GLUCOSE BY METER POCT 421 (H) 70 - 99 mg/dL   CBC with platelets differential    Narrative    The following orders were created for panel order CBC with platelets differential.  Procedure                               Abnormality         Status                     ---------                               -----------         ------                     CBC with platelets and ...[[id number]]  Abnormal            Final result               RBC and Platelet Morpho...[[id number]]                                                   Please view results for these tests on the individual orders.   Comprehensive metabolic panel   Result Value Ref Range    Sodium 138 135 - 145 mmol/L    Potassium 4.7 3.4 - 5.3 mmol/L    Carbon Dioxide (CO2) 24 22 - 29 mmol/L    Anion Gap 13 7 - 15 mmol/L    Urea Nitrogen 30.7 (H) 8.0 - 23.0 mg/dL    Creatinine 1.63 (H) 0.67 - 1.17 mg/dL    GFR Estimate 48 (L) >60 mL/min/1.73m2    Calcium 9.5 8.8 - 10.4 mg/dL    Chloride 101 98 - 107 mmol/L    Glucose 378 (H) 70 - 99 mg/dL    Alkaline Phosphatase 208 (H) 40 - 150 U/L    AST 96 (H) 0 - 45 U/L     (H) 0 - 70 U/L    Protein Total 7.3 6.4 - 8.3 g/dL    Albumin 4.3 3.5 - 5.2 g/dL    Bilirubin Total 0.4 <=1.2 mg/dL   Lipase   Result Value Ref Range    Lipase 60 13 - 60 U/L   Magnesium   Result Value Ref Range    Magnesium 2.0 1.7 - 2.3 mg/dL   Phosphorus   Result Value Ref Range    Phosphorus 3.7 2.5 - 4.5  mg/dL   Blood gas venous   Result Value Ref Range    pH Venous 7.35 7.32 - 7.43    pCO2 Venous 51 (H) 40 - 50 mm Hg    pO2 Venous 24 (L) 25 - 47 mm Hg    Bicarbonate Venous 28 21 - 28 mmol/L    Base Excess/Deficit Venous 1.4 -3.0 - 3.0 mmol/L    FIO2 21     Oxyhemoglobin Venous 48 (L) 70 - 75 %    O2 Sat, Venous 48.8 (L) 70.0 - 75.0 %    Narrative    In healthy individuals, oxyhemoglobin (O2Hb) and oxygen saturation (SO2) are approximately equal. In the presence of dyshemoglobins, oxyhemoglobin can be considerably lower than oxygen saturation.   Lactic Acid Whole Blood with 1X Repeat in 2 HR when >2   Result Value Ref Range    Lactic Acid, Initial 1.7 0.7 - 2.0 mmol/L   Ketone Beta-Hydroxybutyrate Quantitative   Result Value Ref Range    Ketone (Beta-Hydroxybutyrate) Quantitative <0.18 <=0.30 mmol/L   CBC with platelets and differential   Result Value Ref Range    WBC Count 5.7 4.0 - 11.0 10e3/uL    RBC Count 5.40 4.40 - 5.90 10e6/uL    Hemoglobin 15.6 13.3 - 17.7 g/dL    Hematocrit 44.2 40.0 - 53.0 %    MCV 82 78 - 100 fL    MCH 28.9 26.5 - 33.0 pg    MCHC 35.3 31.5 - 36.5 g/dL    RDW 14.1 10.0 - 15.0 %    Platelet Count 139 (L) 150 - 450 10e3/uL    % Neutrophils 62 %    % Lymphocytes 28 %    % Monocytes 7 %    % Eosinophils 2 %    % Basophils 1 %    % Immature Granulocytes 1 %    NRBCs per 100 WBC 0 <1 /100    Absolute Neutrophils 3.6 1.6 - 8.3 10e3/uL    Absolute Lymphocytes 1.6 0.8 - 5.3 10e3/uL    Absolute Monocytes 0.4 0.0 - 1.3 10e3/uL    Absolute Eosinophils 0.1 0.0 - 0.7 10e3/uL    Absolute Basophils 0.0 0.0 - 0.2 10e3/uL    Absolute Immature Granulocytes 0.0 <=0.4 10e3/uL    Absolute NRBCs 0.0 10e3/uL   Influenza A/B, RSV and SARS-CoV2 PCR (COVID-19) Nose    Specimen: Nose; Swab   Result Value Ref Range    Influenza A PCR Negative Negative    Influenza B PCR Negative Negative    RSV PCR Negative Negative    SARS CoV2 PCR Negative Negative    Narrative    Testing was performed using the Xpert Xpress  CoV2/Flu/RSV Assay on the [employer] GeneXpert Instrument. This test should be ordered for the detection of SARS-CoV2, influenza, and RSV viruses in individuals with signs and symptoms of respiratory tract infection. This test is for in vitro diagnostic use under the [place] FDA for laboratories certified under CLIA to perform high or moderate complexity testing. This test has been [place] FDA cleared. A negative result does not rule out the presence of PCR inhibitors in the specimen or target RNA in concentration below the limit of detection for the assay. If only one viral target is positive but coinfection with multiple targets is suspected, the sample should be re-tested with another FDA cleared, approved, or authorized test, if coninfection would change clinical management. This test was validated by the [facility]. These laboratories are certified under the Clinical Laboratory Improvement Amendments of 1988 (CLIA-88) as qualified to perfom high complexity laboratory testing.   CT Chest/Abdomen/Pelvis w Contrast    Narrative    EXAM: CT CHEST/ABDOMEN/PELVIS W CONTRAST  LOCATION: [facility]  DATE: 4/16/2025    INDICATION: Substernal abdominal pain, hyperglycemia, nausea, constipation  COMPARISON: None.  TECHNIQUE: CT scan of the chest, abdomen, and pelvis was performed following injection of IV contrast. Multiplanar reformats were obtained. Dose reduction techniques were used.   CONTRAST: Isovue 370, 56mL    FINDINGS:   LUNGS AND PLEURA: No pulmonary mass, consolidation, or suspicious pulmonary nodule. No pleural effusion or pneumothorax.    MEDIASTINUM/AXILLAE: Cardiac chambers unremarkable. No pericardial effusion. Thoracic aorta normal in caliber. Pulmonary arteries normal in caliber. No abnormally enlarged lymph nodes.    CORONARY ARTERY CALCIFICATION: Mild.    HEPATOBILIARY: There are couple of subcentimeter calcified stones in the gallbladder. Liver  unremarkable.    PANCREAS: Normal.    SPLEEN: Mildly enlarged.    ADRENAL GLANDS: Normal.    KIDNEYS/BLADDER: No renal mass or hydronephrosis. There are couple of punctate hyperdensities within the left kidney which may represent nonobstructing stones or excreted contrast in the calyces.    BOWEL: No free air, free fluid, or inflammatory change. Large amount of stool in the colon compatible with history. No bowel wall thickening or abnormal enhancement. No obstruction. Normal appendix.    LYMPH NODES: Normal.    VASCULATURE: No aneurysm. Moderate stenosis of the proximal SMA secondary to noncalcified plaque best seen on sagittal 65.    PELVIC ORGANS: Normal.    MUSCULOSKELETAL: Moderate vertebral compression deformity at L1, chronic.      Impression    IMPRESSION:  1.  No acute abnormality in the chest, abdomen, or pelvis.  2.  Cholelithiasis.  3.  Mild splenomegaly.  4.  Moderate proximal SMA stenosis.  5.  L1 compression deformity.   UA with Microscopic reflex to Culture    Specimen: Urine, NOS   Result Value Ref Range    Color Urine Straw Colorless, Straw, Light Yellow, Yellow    Appearance Urine Clear Clear    Glucose Urine >1000 (A) Negative mg/dL    Bilirubin Urine Negative Negative    Ketones Urine Negative Negative mg/dL    Specific Gravity Urine 1.023 1.003 - 1.035    Blood Urine Negative Negative    pH Urine 5.5 5.0 - 7.0    Protein Albumin Urine Negative Negative mg/dL    Urobilinogen Urine Normal Normal mg/dL    Nitrite Urine Negative Negative    Leukocyte Esterase Urine Negative Negative    Mucus Urine Present (A) None Seen /LPF    RBC Urine 0 <=2 /HPF    WBC Urine <1 <=5 /HPF    Narrative    Urine Culture not indicated       Medications   sodium chloride 0.9% BOLUS 1,000 mL (0 mLs Intravenous Stopped 4/16/25 2007)   sodium chloride 0.9 % bag for CT scan flush (60 mLs As instructed $Given 4/16/25 1935)   iopamidol (ISOVUE-370) solution 500 mL (56 mLs Intravenous $Given 4/16/25 1935)   Enema Compound  (docusate/mineral oil/NaPhos) NO MAG CIT PREMIX (226 mLs Rectal $Given 4/16/25 2017)   sodium chloride 0.9% BOLUS 1,000 mL (0 mLs Intravenous Stopped 4/16/25 2050)       Assessments & Plan     I have reviewed the findings, diagnosis, plan and need for follow up with the patient.    Discharge Medication List as of 4/16/2025  8:51 PM          Final diagnoses:   Type 2 diabetes mellitus with hyperglycemia, with long-term current use of insulin (H)   Constipation, unspecified constipation type     Disposition: Patient discharged home in stable condition.  Plan as above.  Return for concerns.     Note: Chart documentation done in part with Dragon Voice Recognition software. Although reviewed after completion, some word and grammatical errors may remain.     4/16/2025   [facility] EMERGENCY DEPT       Nichol Duke MD  04/17/25 5350

## 2025-04-17 NOTE — DISCHARGE INSTRUCTIONS
Increase your Lantus to 40 units nightly.    Continue your Humalog insulin with meals.    I would like you to see your primary care provider next week for recheck.    Try to drink more fluids.    Return for significant worsening, changes or concerns.    I hope that you improve very quickly!!

## 2025-04-21 ENCOUNTER — OFFICE VISIT (OUTPATIENT)
Dept: AUDIOLOGY | Facility: CLINIC | Age: 60
End: 2025-04-21
Payer: MEDICARE

## 2025-04-21 DIAGNOSIS — H90.6 MIXED CONDUCTIVE AND SENSORINEURAL HEARING LOSS OF BOTH EARS: Primary | ICD-10-CM

## 2025-04-21 PROCEDURE — T1013 SIGN LANG/ORAL INTERPRETER: HCPCS | Mod: GT | Performed by: INTERPRETER

## 2025-04-21 PROCEDURE — V5275 EAR IMPRESSION: HCPCS | Mod: LT

## 2025-04-21 PROCEDURE — 92591 PR HEARING AID EXAM BINAURAL: CPT

## 2025-04-27 ENCOUNTER — HEALTH MAINTENANCE LETTER (OUTPATIENT)
Age: 60
End: 2025-04-27

## 2025-04-28 ENCOUNTER — VIRTUAL VISIT (OUTPATIENT)
Dept: FAMILY MEDICINE | Facility: OTHER | Age: 60
End: 2025-04-28
Payer: MEDICARE

## 2025-04-28 DIAGNOSIS — J44.9 CHRONIC OBSTRUCTIVE PULMONARY DISEASE, UNSPECIFIED COPD TYPE (H): ICD-10-CM

## 2025-04-28 DIAGNOSIS — M54.50 CHRONIC BILATERAL LOW BACK PAIN WITHOUT SCIATICA: ICD-10-CM

## 2025-04-28 DIAGNOSIS — E11.22 TYPE 2 DIABETES MELLITUS WITH STAGE 3B CHRONIC KIDNEY DISEASE, WITH LONG-TERM CURRENT USE OF INSULIN (H): Primary | ICD-10-CM

## 2025-04-28 DIAGNOSIS — Z79.4 TYPE 2 DIABETES MELLITUS WITH STAGE 3B CHRONIC KIDNEY DISEASE, WITH LONG-TERM CURRENT USE OF INSULIN (H): Primary | ICD-10-CM

## 2025-04-28 DIAGNOSIS — R41.3 MEMORY LOSS: ICD-10-CM

## 2025-04-28 DIAGNOSIS — I10 BENIGN ESSENTIAL HYPERTENSION: ICD-10-CM

## 2025-04-28 DIAGNOSIS — M1A.9XX1 CHRONIC TOPHACEOUS GOUT: ICD-10-CM

## 2025-04-28 DIAGNOSIS — F33.0 MILD RECURRENT MAJOR DEPRESSION: ICD-10-CM

## 2025-04-28 DIAGNOSIS — G89.29 CHRONIC BILATERAL LOW BACK PAIN WITHOUT SCIATICA: ICD-10-CM

## 2025-04-28 DIAGNOSIS — N18.32 TYPE 2 DIABETES MELLITUS WITH STAGE 3B CHRONIC KIDNEY DISEASE, WITH LONG-TERM CURRENT USE OF INSULIN (H): Primary | ICD-10-CM

## 2025-04-28 PROCEDURE — 98006 SYNCH AUDIO-VIDEO EST MOD 30: CPT | Performed by: PHYSICIAN ASSISTANT

## 2025-04-28 RX ORDER — INSULIN GLARGINE 100 [IU]/ML
50 INJECTION, SOLUTION SUBCUTANEOUS AT BEDTIME
Qty: 45 ML | Refills: 1 | Status: SHIPPED | OUTPATIENT
Start: 2025-04-28

## 2025-04-28 RX ORDER — GABAPENTIN 300 MG/1
600 CAPSULE ORAL AT BEDTIME
Qty: 180 CAPSULE | Refills: 3 | Status: SHIPPED | OUTPATIENT
Start: 2025-04-28

## 2025-04-28 RX ORDER — LANCETS
EACH MISCELLANEOUS
Qty: 100 EACH | Refills: 6 | Status: SHIPPED | OUTPATIENT
Start: 2025-04-28

## 2025-04-28 NOTE — PATIENT INSTRUCTIONS
Will increase gabapentin to 600 mg (2 capsules) nightly for sleep, mood and pain.  Will place updated referral for psychiatry.    Will increase Lantus insulin to 50 units nightly and increase Jardiance (empagliflozin) from 10 mg to 25 mg.  Will refer to the diabetic educator and send a new glucose meter to check when he his high.  If he is above 500 or has increased confusion or lethargy, he should be seen emergently.  If he starts to have low readings below 80, can decrease Lantus to 45 units.    Follow-up in 2-3 months for a recheck.

## 2025-04-28 NOTE — PROGRESS NOTES
"Diana is a 59 year old who is being evaluated via a billable video visit.    How would you like to obtain your AVS? MyChart  If the video visit is dropped, the invitation should be resent by: Text to cell phone: [phone]  Will anyone else be joining your video visit? No      Assessment & Plan       ICD-10-CM    1. Type 2 diabetes mellitus with stage 3b chronic kidney disease, with long-term current use of insulin (H)  E11.22 Adult Diabetes Education  Referral    N18.32 empagliflozin (JARDIANCE) 25 MG TABS tablet    Z79.4 LANTUS SOLOSTAR 100 UNIT/ML soln     blood glucose monitoring (NO BRAND SPECIFIED) meter device kit     blood glucose (NO BRAND SPECIFIED) test strip     thin (NO BRAND SPECIFIED) lancets      2. Chronic tophaceous gout  M1A.9XX1       3. Chronic bilateral low back pain without sciatica  M54.50 gabapentin (NEURONTIN) 300 MG capsule    G89.29       4. Mild recurrent major depression  F33.0 Adult Mental Health  Referral     gabapentin (NEURONTIN) 300 MG capsule      5. Memory loss  R41.3 Adult Mental Health  Referral     gabapentin (NEURONTIN) 300 MG capsule      6. Chronic obstructive pulmonary disease, unspecified COPD type (H)  J44.9       7. Benign essential hypertension  I10           1. His diabetes remains poorly controlled despite being diligent with his insulin and having it increased 2 weeks ago in the ED. Will further increase Lantus from 40 units to 50 units nightly and will increase his Jardiance from 10 mg to 25 mg. Will continue Novolog 15 units prior to meals. If he starts to have hypoglycemia, can decrease Lantus to 45 units. I will also place a priority referral to the diabetic educator for additional assistance and treatment of his diabetes. I recommend small, frequent low carb meals. He should avoid skipping meals. Will send over a traditional meter to have on hand to check when glucose reads \"high\" on the CGM system. If above 500 or if he has increased " confusion or extreme lethargy, he should be seen emergently.     2. He his following with rheumatology again and they are planning on new medications for his gout if covered by insurance. Continue with Tylenol as needed.    3-5. Will increase gabapentin to 600 mg nightly to help with sleep, mood and pain. Will also place an updated referral to psychiatry for further evaluation of his mental health. We are still awaiting the signed C2C to be uploaded but I confirm that this has been completed by his daughter Yady. I recommend they add vitamin D and ginkgo biloba to help with his energy and memory. He has a neurology visit scheduled in August.    6. Continue inhaler as directed.    7. Blood pressure is stable.          Subjective   Diana is a 59 year old, presenting for the following health issues:  Diabetes and Memory Loss        4/28/2025     2:40 PM   Additional Questions   Roomed by Malathi   Accompanied by Daughter Yady     Video Start Time: 3:17 pm    HPI      Diabetes Follow-up    How often are you checking your blood sugar? Two times daily  Blood sugar testing frequency justification:  Uncontrolled diabetes, Adjustment of medication(s), Risk of hypoglycemia with medication(s), and Patient modifying lifestyle changes (diet, exercise) with blood sugars  What time of day are you checking your blood sugars (select all that apply)?   Once in the morning and once at night  Have you had any blood sugars above 200?  Yes - frequently above 300  Have you had any blood sugars below 70?  No  What symptoms do you notice when your blood sugar is low?  Shaky and Lethargy  What concerns do you have today about your diabetes? None   Do you have any of these symptoms? (Select all that apply)  No numbness or tingling in feet.  No redness, sores or blisters on feet.  No complaints of excessive thirst.  No reports of blurry vision.  No significant changes to weight.      His daughter helps translate for Diana today as he is having a  "difficult day with a gout flare. It has been more painful in his knee which has made it difficult for him to walk today so the appointment was changed to a video visit. He saw rheumatology on Friday and they prescribed a new injectable gout medication but are awaiting insurance coverage. He is not sleeping great at night and he has been more anxious lately. He does not like being around anyone other than his family and it is his son and daughter who takes turns helping him throughout the day. His daughter is a PCA for him and he has declined home care nursing and PT. He has been more forgetful the past few months and she finds him talking to himself at times. She has been unable to schedule with psych yet due to lack of signed C2C on file. She has done this twice but it has still not shown up as being scanned in his chart.     His glucose has remained high, often above 300 and likely higher but there is a limit on his CGM so it just reads \"high\" and not a specific number. He does not have a finger stick glucose meter anymore. He is trying to eat less but glucose remains elevated. He is on 40 units of Lantus nightly and 15 units of Novolog before meals. He also remains on Jardiance 10 mg daily.       ED Summary 4/16    ED Course (with Medical Decision Making)     Pt seen and examined by me.  RN and EPIC notes reviewed.        Patient with type 2 diabetes, hyperglycemia.  Generally feeling unwell with nausea.  Concerns for constipation.  Point-of-care glucose 421     IV placed for fluids, labs.  CBC with normal white count, normal hemoglobin.  Platelets 139  Ketones negative  Lactic acid 1.7  VBG without acidosis.  7.3 5/51/24/28  Normal phosphorus and magnesium  CMP with elevated BUN/creatinine at 3.7/1.63.  Glucose 378.  LFTs elevated alk phosphatase 2 await, AST 96, .  Normal bilirubin.  Normal lipase  COVID/influenza/RSV negative  Urinalysis with elevated specific gravity and glucose.  No evidence for " infection     In looking for possible underlying infectious or abnormal cause for his high sugars I did check CT scan.   no acute abnormality noted in the chest/abdomen/pelvis.  He has gallstones, mild splenomegaly.  SMA stenosis noted proximally.  Chronic appearing L1 compression deformity.     There was not a lot of stool but I did give the patient a pink lady enema.  He noted significant improvement in his abdominal discomfort after the enema.  He feels comfortable to return home.  I recommend that he increase his Lantus 5 units.  I would have him follow-up with his primary care provider.  I did review chart and note unfortunately patient has had a high hemoglobin A1c at 11.1 in January but at that time had been off of his insulin.  I am going to have him closely follow-up in clinic.  Return at anytime for worsening, changes or concerns    BP Readings from Last 2 Encounters:   04/25/25 138/78   04/16/25 (!) 140/83     Hemoglobin A1C (%)   Date Value   01/20/2025 11.1 (H)     LDL Cholesterol Calculated (mg/dL)   Date Value   01/20/2025 76       Concern - Memory Loss   Onset: about 1 year  Progression of Symptoms:  worsening  Accompanying Signs & Symptoms: talking to himself      Review of Systems  Constitutional, HEENT, cardiovascular, pulmonary, neuro, psych, musculoskeletal, gi and gu systems are negative, except as otherwise noted.      Objective           Vitals:  No vitals were obtained today due to virtual visit.    Physical Exam   GENERAL: alert and no distress, lying in bed  EYES: Eyes grossly normal to inspection.  No discharge or erythema, or obvious scleral/conjunctival abnormalities.  RESP: No audible wheeze, cough, or visible cyanosis.    SKIN: Visible skin clear. No significant rash, abnormal pigmentation or lesions.  NEURO: Cranial nerves grossly intact. Mentation and speech appropriate for age.  PSYCH: Appropriate affect, tone, and pace of words        Video-Visit Details    Type of service:  Video  Visit   Video End Time: 3:41 pm  Originating Location (pt. Location): Home  Distant Location (provider location):  Off-site  Platform used for Video Visit: Alondra  Signed Electronically by: Lenin Jacob PA-C

## 2025-04-29 ENCOUNTER — TELEPHONE (OUTPATIENT)
Dept: RHEUMATOLOGY | Facility: CLINIC | Age: 60
End: 2025-04-29
Payer: MEDICARE

## 2025-04-29 NOTE — TELEPHONE ENCOUNTER
MTM referral from: [facility] visit (referral by provider)    MTM referral outreach attempt #2 on April 29, 2025 at 9:37 AM      Outcome: Patient not reachable after several attempts, routed to Pharmacist Team/Provider as an FYI    Use HB for the carrier/Plan on the flowsheet      [employer] Message Sent    MIKAYLA Miranda

## 2025-04-29 NOTE — TELEPHONE ENCOUNTER
PA Initiation    Medication: CANAKINUMAB 150 MG/ML SC SOLN  Insurance Company: [employer] Part D - Phone [phone] Fax [phone]  Pharmacy Filling the Rx:    Filling Pharmacy Phone:    Filling Pharmacy Fax:    Start Date: 4/29/2025

## 2025-05-02 ENCOUNTER — APPOINTMENT (OUTPATIENT)
Dept: INTERPRETER SERVICES | Facility: CLINIC | Age: 60
End: 2025-05-02
Payer: MEDICARE

## 2025-05-02 NOTE — TELEPHONE ENCOUNTER
PRIOR AUTHORIZATION DENIED    Medication: CANAKINUMAB 150 MG/ML SC SOLN  [employer]: [employer] - Phone [phone] Fax [phone]  Denial Date: 4/29/2025  Denial Reason(s):         Appeal Information:           Patient Notified:

## 2025-05-05 ENCOUNTER — VIRTUAL VISIT (OUTPATIENT)
Dept: EDUCATION SERVICES | Facility: CLINIC | Age: 60
End: 2025-05-05
Attending: PHYSICIAN ASSISTANT
Payer: MEDICARE

## 2025-05-05 DIAGNOSIS — N18.32 TYPE 2 DIABETES MELLITUS WITH STAGE 3B CHRONIC KIDNEY DISEASE, WITH LONG-TERM CURRENT USE OF INSULIN (H): ICD-10-CM

## 2025-05-05 DIAGNOSIS — Z79.4 TYPE 2 DIABETES MELLITUS WITH STAGE 3B CHRONIC KIDNEY DISEASE, WITH LONG-TERM CURRENT USE OF INSULIN (H): ICD-10-CM

## 2025-05-05 DIAGNOSIS — E11.22 TYPE 2 DIABETES MELLITUS WITH STAGE 3B CHRONIC KIDNEY DISEASE, WITH LONG-TERM CURRENT USE OF INSULIN (H): ICD-10-CM

## 2025-05-05 PROCEDURE — G0108 DIAB MANAGE TRN  PER INDIV: HCPCS | Mod: 95 | Performed by: DIETITIAN, REGISTERED

## 2025-05-05 RX ORDER — INSULIN LISPRO 100 [IU]/ML
5 INJECTION, SOLUTION INTRAVENOUS; SUBCUTANEOUS
Qty: 15 ML | Refills: 1 | Status: SHIPPED | OUTPATIENT
Start: 2025-05-05

## 2025-05-05 NOTE — PROGRESS NOTES
"troy , #[id number]. Akiko, helped with today's visit.     Diabetes Self-Management Education & Support    Presents for: Individual review    Type of service:  Video Visit    If the video visit is dropped, the video visit invitation should be resent by: Text to cell phone: [phone]    Originating Location (pt. Location): Home  Distant Location (provider location): Offsite  Mode of Communication:  Video Conference via [employer]    Video Start Time:  9:04am  Video End Time (time video stopped): 10:08am    How would patient like to obtain AVS? MyChart      Assessment  Diana is concerned about his high blood glucose. Says not notice much change in his blood glucose after the doctor's visit, which was odd since insulin amounts were increased.  Diana uses the [employer] [place] so is not sharing blood glucose data. His son helped him read some scanned values off the reader and all of his blood glucose are >200 mg/dL, often coming back at \"HI\".     Reviewed insulin use, both storage and injection use and amount. Diana has been storing his insulin at room temperature and thinks he last picked up an insulin prescription in March. Suspect his insulin may no longer be as effective after 4 weeks so encouraged Diana to keep his insulin cold until ready to use it. Says he is willing to do this as he was not told this in the past. Sounds like he is injecting correctly and using both caps.     When reviewed insulin amounts, thankfully his son was able to get the insulin pens since thought Diana said one he took 3x/day but was only taking 10 units and one was weekly. He reports taking 10 units of Humalog 3x/day before meals but thought 10 units was the maximum dose. He is only taking 10 units of Lantus weekly.    Since patient is usually taking 30 units of Humalog insulin daily but some of the effectiveness may be reduced due to using insulin that has been out at room temperature for more than 28 days, recommended starting with the " TDD of 30 units, but dividing between Lantus and Humalog. A prescription for Lantus was just sent to [facility] on Wednesday, not picked up yet, so sent a new prescription for Humalog and insulin pen needles. Recommended taking 15 units of Lantus daily and reassured Diana that can take more than 10 units and discussed how the pen can dial up to 80 units if needed. Recommended lowering Humalog to 5 units before each meal. Since he likely will need more insulin, planned a follow up on Friday to see how things are going and if blood glucose improving, and will increase by 6 units weekly as needed until seeing blood glucose in target. Had Diana repeat back instructions and able to do this correctly. Told Diana that his dose on Lantus pen box will be different that what we are discussing today. Son was also there to hear this information and write note on the insulin box. Pt verbalized understanding of concepts discussed and recommendations provided.         Patient's most recent   Lab Results   Component Value Date    A1C 11.1 01/20/2025     is not meeting goal of <7.0    Diabetes knowledge and skills assessment:   Patient is knowledgeable in diabetes management concepts related to: Monitoring and Taking Medication    Based on learning assessment above, most appropriate setting for further diabetes education would be: Individual setting.    Care Plan and Education Provided:  Diabetes Pathophysiology  Monitoring: Frequency of monitoring, Individual glucose targets, and Log and interpret results  Taking Medication: Action of prescribed medication(s), Administering and storing injectable diabetes medications, Side effects of prescribed medication(s), and When to take medication(s)    Patient verbalized understanding of diabetes self-management education concepts discussed, opportunities for ongoing education and support, and recommendations provided today.    Plan    Change to Lantus: 15 units before bed daily.  Humalog: lower  from 10 units before meals, down to 5 units before each meal    Topics to cover at upcoming visits: Healthy Eating, Taking Medication, and Problem Solving    See Care Plan for co-developed, patient-state behavior change goals.    Education Materials Provided:  No new materials provided today      Subjective/Objective  Diana is an 59 year old, presenting for the following diabetes education related to: Individual review  Accompanied by: Self,   Diabetes education in the past 24mo: (Patient-Rptd) No  Diabetes type: (Patient-Rptd) Type 2  Disease course: (Patient-Rptd) Worsening  How confident are you filling out medical forms by yourself:: (Patient-Rptd) Not Assessed  Diabetes management related comments/concerns: high blood sugar. Says the machine that reads BG, cannot always read his BG since above 300 mg/dL. Says he still takes the same medication as he did in [place]. Says back in [place], his BG was lower. Says he had a BG meter before changing to the sensor. He did have his BG checked in the hospital and it was high, like on the sensor.  Says even when living in [place].    Says when he wakes up, his BG is 300 mg/dL. When he eats his blood glucose goes higher than this. Feels like he wants to vomit and is dizzy when his blood glucose is high upon waking on an empty stomach- has a sensation that he is drunk.     Keeps insulin in his cabinet and takes out a pen as needed. Was not told how to store it. Says he picked up insulin about March. When he uses the insulin, not feel like it helps much.     Says he will take 10 units of Humalog before each meal  He is taking 10 units of Lantus on either Wednesday or Thursday of the week.    Other concerns: Language barrier, English as a second language  Cultural Influences/Ethnic Background:  Not  or       Diabetes Symptoms & Complications:  Diabetes Related Symptoms: (Patient-Rptd) Fatigue, Polyphagia (increased hunger), Polyuria (increased  "urination)  Weight trend: (Patient-Rptd) Stable  Symptom course: (Patient-Rptd) Worsening  Disease course: (Patient-Rptd) Worsening  Complications assessed today?: Yes  Nephropathy: Yes    Patient Problem List and Family Medical History reviewed for relevant medical history, current medical status, and diabetes risk factors.    Vitals:  There were no vitals taken for this visit.  Estimated body mass index is 24.04 kg/m  as calculated from the following:    Height as of 3/24/25: 1.473 m (4' 10\").    Weight as of 4/25/25: 52.2 kg (115 lb).   Last 3 BP:   BP Readings from Last 3 Encounters:   04/25/25 138/78   04/16/25 (!) 140/83   03/24/25 138/82       History   Smoking Status    Never   Smokeless Tobacco    Never       Labs:  Lab Results   Component Value Date    A1C 11.1 01/20/2025     Lab Results   Component Value Date     04/16/2025     04/16/2025     Lab Results   Component Value Date    LDL 76 01/20/2025     Direct Measure HDL   Date Value Ref Range Status   01/20/2025 31 (L) >=40 mg/dL Final     GFR Estimate   Date Value Ref Range Status   04/16/2025 48 (L) >60 mL/min/1.73m2 Final     Comment:     eGFR calculated using 2021 CKD-EPI equation.     No results found for: \"GFRESTBLACK\"  Lab Results   Component Value Date    CR 1.63 04/16/2025     No results found for: \"MICROL\", \"UMALCR\", \"UCRR\"        5/5/2025   Monitoring   Monitoring Assessed Today Yes   Did patient bring glucose meter to appointment?  Yes   Blood Glucose Meter One Touch;CGM       Jose 2   Times checking blood sugar at home (number) 3   Times checking blood sugar at home (per) Day     Blood glucose Results:    He uses the reader to scan his BG (Jose 2).   Blood glucose:   5/4: 325  5/3: 323  5/2: -, HI  4/28: HI, HI, 263    Diabetes Medication(s)       Insulin       HUMALOG KWIKPEN 100 UNIT/ML soln Inject 15 Units subcutaneously 3 times daily (before meals).     insulin aspart (NOVOLOG FLEXPEN) 100 UNIT/ML pen      LANTUS SOLOSTAR " 100 UNIT/ML soln Inject 50 Units subcutaneously at bedtime.       Sodium-Glucose Co-Transporter 2 (SGLT2) Inhibitors       empagliflozin (JARDIANCE) 25 MG TABS tablet Take 1 tablet (25 mg) by mouth daily.        **Patient says he is taking 10 units Humalog before meals. Taking 10 units of Lantus 1x/week.**        5/5/2025   Taking Medications   Taking Medication Assessed Today Yes   Current Treatments Oral Medication (taken by mouth);Insulin Injections   Dose schedule Pre-breakfast;Pre-lunch;Pre-dinner       Only taking Lantus - 10 units 1x a week.   Given by Patient   Problems taking diabetes medications regularly? Yes   Diabetes medication side effects? No       Leann oMdi RDN, SANA, [facility]   Time Spent: 64 minutes  Encounter Type: Individual    Any diabetes medication dose changes were made via the [facility] Standing Orders under the patient's referring provider.

## 2025-05-05 NOTE — PATIENT INSTRUCTIONS
Keep all insulin pens/boxes in the refrigerator until ready to use (keep away from sides so it does not freeze). When you are ready to use it, pull out one pen at a time and this one is OK to keep at room temperature.  -If the insulin is not kept cold, it will go bad after 4 weeks.   -Use a new needle and remove the insulin pen needle after each use    2. Lantus (gray and purple pen): take 15 units before bed (once a day).  **This dose will be different from what is on your insulin box. Please ignore the instructions on the box and follow these recommendations**    3. Humalo units before breakfast, 5 units before lunch, 5 units before dinner (take it 3 times a day)      4. Scan and record blood glucose before each meal and bed when you scan for us to review on Friday.  You will probably still see how blood glucose but we will adjust on Friday to help slowly bring blood glucose to target.    FOLLOW UP APPOINTMENT: Video visit follow up on Friday, May 9th at 8am to check on blood glucose with insulin changes.    Leann Modi RDN, LD, [facility]   [phone]

## 2025-05-05 NOTE — LETTER
"    5/5/2025         RE: Diana Quezada  [id number] [address]        Dear Colleague,    Thank you for referring your patient, Diana Quezada, to the [facility]. Please see a copy of my visit note below.    Richardson , #[id number]. Akiko, helped with today's visit.     Diabetes Self-Management Education & Support    Presents for: Individual review    Type of service:  Video Visit    If the video visit is dropped, the video visit invitation should be resent by: Text to cell phone: [phone]    Originating Location (pt. Location): Home  Distant Location (provider location): Offsite  Mode of Communication:  Video Conference via [employer]    Video Start Time:  9:04am  Video End Time (time video stopped): 10:08am    How would patient like to obtain AVS? MyChart      Assessment  Diana is concerned about his high blood glucose. Says not notice much change in his blood glucose after the doctor's visit, which was odd since insulin amounts were increased.  Diana uses the [employer] [place] so is not sharing blood glucose data. His son helped him read some scanned values off the reader and all of his blood glucose are >200 mg/dL, often coming back at \"HI\".     Reviewed insulin use, both storage and injection use and amount. Diana has been storing his insulin at room temperature and thinks he last picked up an insulin prescription in March. Suspect his insulin may no longer be as effective after 4 weeks so encouraged Diana to keep his insulin cold until ready to use it. Says he is willing to do this as he was not told this in the past. Sounds like he is injecting correctly and using both caps.     When reviewed insulin amounts, thankfully his son was able to get the insulin pens since thought Diana said one he took 3x/day but was only taking 10 units and one was weekly. He reports taking 10 units of Humalog 3x/day before meals but thought 10 units was the maximum dose. He is only taking 10 units of " Lantus weekly.    Since patient is usually taking 30 units of Humalog insulin daily but some of the effectiveness may be reduced due to using insulin that has been out at room temperature for more than 28 days, recommended starting with the TDD of 30 units, but dividing between Lantus and Humalog. A prescription for Lantus was just sent to [facility] on Wednesday, not picked up yet, so sent a new prescription for Humalog and insulin pen needles. Recommended taking 15 units of Lantus daily and reassured Diana that can take more than 10 units and discussed how the pen can dial up to 80 units if needed. Recommended lowering Humalog to 5 units before each meal. Since he likely will need more insulin, planned a follow up on Friday to see how things are going and if blood glucose improving, and will increase by 6 units weekly as needed until seeing blood glucose in target. Had Diana repeat back instructions and able to do this correctly. Told Diana that his dose on Lantus pen box will be different that what we are discussing today. Son was also there to hear this information and write note on the insulin box. Pt verbalized understanding of concepts discussed and recommendations provided.         Patient's most recent   Lab Results   Component Value Date    A1C 11.1 01/20/2025     is not meeting goal of <7.0    Diabetes knowledge and skills assessment:   Patient is knowledgeable in diabetes management concepts related to: Monitoring and Taking Medication    Based on learning assessment above, most appropriate setting for further diabetes education would be: Individual setting.    Care Plan and Education Provided:  Diabetes Pathophysiology  Monitoring: Frequency of monitoring, Individual glucose targets, and Log and interpret results  Taking Medication: Action of prescribed medication(s), Administering and storing injectable diabetes medications, Side effects of prescribed medication(s), and When to take medication(s)    Patient  verbalized understanding of diabetes self-management education concepts discussed, opportunities for ongoing education and support, and recommendations provided today.    Plan    Change to Lantus: 15 units before bed daily.  Humalog: lower from 10 units before meals, down to 5 units before each meal    Topics to cover at upcoming visits: Healthy Eating, Taking Medication, and Problem Solving    See Care Plan for co-developed, patient-state behavior change goals.    Education Materials Provided:  No new materials provided today      Subjective/Objective  iDana is an 59 year old, presenting for the following diabetes education related to: Individual review  Accompanied by: Self,   Diabetes education in the past 24mo: (Patient-Rptd) No  Diabetes type: (Patient-Rptd) Type 2  Disease course: (Patient-Rptd) Worsening  How confident are you filling out medical forms by yourself:: (Patient-Rptd) Not Assessed  Diabetes management related comments/concerns: high blood sugar. Says the machine that reads BG, cannot always read his BG since above 300 mg/dL. Says he still takes the same medication as he did in [place]. Says back in [place], his BG was lower. Says he had a BG meter before changing to the sensor. He did have his BG checked in the hospital and it was high, like on the sensor.  Says even when living in [place].    Says when he wakes up, his BG is 300 mg/dL. When he eats his blood glucose goes higher than this. Feels like he wants to vomit and is dizzy when his blood glucose is high upon waking on an empty stomach- has a sensation that he is drunk.     Keeps insulin in his cabinet and takes out a pen as needed. Was not told how to store it. Says he picked up insulin about March. When he uses the insulin, not feel like it helps much.     Says he will take 10 units of Humalog before each meal  He is taking 10 units of Lantus on either Wednesday or Thursday of the week.    Other concerns: Language  "barrier, English as a second language  Cultural Influences/Ethnic Background:  Not  or       Diabetes Symptoms & Complications:  Diabetes Related Symptoms: (Patient-Rptd) Fatigue, Polyphagia (increased hunger), Polyuria (increased urination)  Weight trend: (Patient-Rptd) Stable  Symptom course: (Patient-Rptd) Worsening  Disease course: (Patient-Rptd) Worsening  Complications assessed today?: Yes  Nephropathy: Yes    Patient Problem List and Family Medical History reviewed for relevant medical history, current medical status, and diabetes risk factors.    Vitals:  There were no vitals taken for this visit.  Estimated body mass index is 24.04 kg/m  as calculated from the following:    Height as of 3/24/25: 1.473 m (4' 10\").    Weight as of 4/25/25: 52.2 kg (115 lb).   Last 3 BP:   BP Readings from Last 3 Encounters:   04/25/25 138/78   04/16/25 (!) 140/83   03/24/25 138/82       History   Smoking Status     Never   Smokeless Tobacco     Never       Labs:  Lab Results   Component Value Date    A1C 11.1 01/20/2025     Lab Results   Component Value Date     04/16/2025     04/16/2025     Lab Results   Component Value Date    LDL 76 01/20/2025     Direct Measure HDL   Date Value Ref Range Status   01/20/2025 31 (L) >=40 mg/dL Final     GFR Estimate   Date Value Ref Range Status   04/16/2025 48 (L) >60 mL/min/1.73m2 Final     Comment:     eGFR calculated using 2021 CKD-EPI equation.     No results found for: \"GFRESTBLACK\"  Lab Results   Component Value Date    CR 1.63 04/16/2025     No results found for: \"MICROL\", \"UMALCR\", \"UCRR\"        5/5/2025   Monitoring   Monitoring Assessed Today Yes   Did patient bring glucose meter to appointment?  Yes   Blood Glucose Meter One Touch;CGM       Jose 2   Times checking blood sugar at home (number) 3   Times checking blood sugar at home (per) Day     Blood glucose Results:    He uses the reader to scan his BG (Jose 2).   Blood glucose:   5/4: 325  5/3: " 323  5/2: -, HI  4/28: HI, HI, 263    Diabetes Medication(s)       Insulin       HUMALOG KWIKPEN 100 UNIT/ML soln Inject 15 Units subcutaneously 3 times daily (before meals).     insulin aspart (NOVOLOG FLEXPEN) 100 UNIT/ML pen      LANTUS SOLOSTAR 100 UNIT/ML soln Inject 50 Units subcutaneously at bedtime.       Sodium-Glucose Co-Transporter 2 (SGLT2) Inhibitors       empagliflozin (JARDIANCE) 25 MG TABS tablet Take 1 tablet (25 mg) by mouth daily.        **Patient says he is taking 10 units Humalog before meals. Taking 10 units of Lantus 1x/week.**        5/5/2025   Taking Medications   Taking Medication Assessed Today Yes   Current Treatments Oral Medication (taken by mouth);Insulin Injections   Dose schedule Pre-breakfast;Pre-lunch;Pre-dinner       Only taking Lantus - 10 units 1x a week.   Given by Patient   Problems taking diabetes medications regularly? Yes   Diabetes medication side effects? No       Leann Modi RDN, SANA, [facility]   Time Spent: 64 minutes  Encounter Type: Individual    Any diabetes medication dose changes were made via the [facility] Standing Orders under the patient's referring provider.

## 2025-05-06 NOTE — PROGRESS NOTES
AUDIOLOGY REPORT    SUBJECTIVE: Diana Quezada, a 59 year old male, was seen in the Audiology Clinic at [facility] today for a Binaural hearing aid fitting. 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 The patient was given medical clearance to pursue amplification by  Rj Edge MD.      OBJECTIVE:  Prior to fitting, a hearing aid check was performed to ensure device functionality. The hearing aid conformity evaluation was completed.The hearing aids were placed and they provided a good fit. Real-ear-probe-microphone measurements were completed on the [employer] system and were a good match to NAL-NL2 target with soft sounds audible, moderate sounds comfortable, and loud sounds below discomfort. UCLs are verified through maximum power output measures and demonstrate appropriate limiting of loud inputs. Mr. Quezada was oriented to proper hearing aid use, care, cleaning (no water, dry brush), batteries (size rechargeable, insertion/removal, toxicity, low-battery signal), aid insertion/removal, user booklet, warranty information, storage cases, and other hearing aid details. The patient confirmed understanding of hearing aid use and care, and showed proper insertion of hearing aid and batteries while in the office today. Mr. Quezada reported good volume and sound quality today.    EAR(S) FIT: Binaural  MA HEARING AID MAKE: Right: Markus Edge AI 24; Left: Markus Edge AI 24    MA HEARING AID MODEL #: Right: [id number]; Left: [id number]  HEARING AID STYLE: Right: ITE; Left: ITE  SERIAL NUMBERS: Right: [id number]; Left: [id number]  WARRANTY END DATE: Right: 5/24/2028; Left:: 5/24/2028           ASSESSMENT: Binaural hearing aid fitting completed today. Verification measures were performed. The 45 day trial period was explained to patient, and they expressed understanding. Mr. Quezada signed the Hearing Aid Purchase Agreement and was given a copy, as well as details on his hearing aids.  Patient was counseled that exact out of pocket amounts cannot be determined for hearing aid claims being sent to insurance. Any insurance coverage information presented to the patient is an estimate only, and is not a guarantee of payment. Patient has been advised to check with their own insurance.    PLAN: Mr. Quezada will return for follow-up in 2-3 weeks for a hearing aid review appointment. Please call this clinic with questions regarding today s appointment.    Cuca Sifuentes, CCC-A  Doctor of Audiology, [place] #[id number]   May 12, 2025

## 2025-05-06 NOTE — PATIENT INSTRUCTIONS

## 2025-05-06 NOTE — TELEPHONE ENCOUNTER
Medication Appeal Initiation    Medication: CANAKINUMAB 150 MG/ML SC SOLN  Appeal Start Date:  5/6/2025  Insurance Company: [employer] Phone: [phone]  Insurance Fax: [phone]  Comments:   Drafted appeal letter and faxed with chart notes.

## 2025-05-07 ENCOUNTER — MYC REFILL (OUTPATIENT)
Dept: FAMILY MEDICINE | Facility: OTHER | Age: 60
End: 2025-05-07
Payer: MEDICARE

## 2025-05-07 DIAGNOSIS — M1A.9XX1 CHRONIC TOPHACEOUS GOUT: ICD-10-CM

## 2025-05-08 RX ORDER — ALLOPURINOL 100 MG/1
200 TABLET ORAL DAILY
Qty: 180 TABLET | Refills: 3 | OUTPATIENT
Start: 2025-05-08

## 2025-05-08 NOTE — TELEPHONE ENCOUNTER
MEDICATION APPEAL APPROVED    Medication: CANAKINUMAB 150 MG/ML SC SOLN  Authorization Effective Date: 5/8/2025  Authorization Expiration Date: 7/30/2025  Approved Dose/Quantity: 1 per 84 days  Reference #: Key: [id number]   [facility] Insurance Company: [facility]  Expected CoPay: $ 4.8     CoPay Card Available: No  Financial Assistance Needed: N/A  Filling Pharmacy: [facility] [place] [address]    Per test claim, approval exp 7/30/25.

## 2025-05-12 ENCOUNTER — OFFICE VISIT (OUTPATIENT)
Dept: AUDIOLOGY | Facility: CLINIC | Age: 60
End: 2025-05-12
Payer: MEDICARE

## 2025-05-12 DIAGNOSIS — H90.6 MIXED CONDUCTIVE AND SENSORINEURAL HEARING LOSS OF BOTH EARS: Primary | ICD-10-CM

## 2025-05-12 PROCEDURE — T1013 SIGN LANG/ORAL INTERPRETER: HCPCS | Mod: GT | Performed by: INTERPRETER

## 2025-05-19 ENCOUNTER — APPOINTMENT (OUTPATIENT)
Dept: INTERPRETER SERVICES | Facility: CLINIC | Age: 60
End: 2025-05-19
Payer: MEDICARE

## 2025-05-20 ENCOUNTER — TELEPHONE (OUTPATIENT)
Dept: RHEUMATOLOGY | Facility: CLINIC | Age: 60
End: 2025-05-20
Payer: MEDICARE

## 2025-05-20 NOTE — TELEPHONE ENCOUNTER
Received fax from  specialty pharmacy stating that they could not get in touch with pt to set up delivery for Ilaris. I sent her a [employer] message with their number to call to get that going. Just FYI..

## 2025-05-22 ENCOUNTER — TELEPHONE (OUTPATIENT)
Dept: FAMILY MEDICINE | Facility: OTHER | Age: 60
End: 2025-05-22
Payer: MEDICARE

## 2025-06-30 ENCOUNTER — VIRTUAL VISIT (OUTPATIENT)
Dept: FAMILY MEDICINE | Facility: OTHER | Age: 60
End: 2025-06-30
Payer: MEDICARE

## 2025-06-30 DIAGNOSIS — L02.611 ABSCESS OF RIGHT FOOT: Primary | ICD-10-CM

## 2025-06-30 DIAGNOSIS — E11.22 TYPE 2 DIABETES MELLITUS WITH STAGE 3B CHRONIC KIDNEY DISEASE, WITH LONG-TERM CURRENT USE OF INSULIN (H): ICD-10-CM

## 2025-06-30 DIAGNOSIS — N18.32 TYPE 2 DIABETES MELLITUS WITH STAGE 3B CHRONIC KIDNEY DISEASE, WITH LONG-TERM CURRENT USE OF INSULIN (H): ICD-10-CM

## 2025-06-30 DIAGNOSIS — Z79.4 TYPE 2 DIABETES MELLITUS WITH STAGE 3B CHRONIC KIDNEY DISEASE, WITH LONG-TERM CURRENT USE OF INSULIN (H): ICD-10-CM

## 2025-06-30 PROCEDURE — 98006 SYNCH AUDIO-VIDEO EST MOD 30: CPT | Performed by: PHYSICIAN ASSISTANT

## 2025-06-30 NOTE — PROGRESS NOTES
Diana is a 59 year old who is being evaluated via a billable video visit.    How would you like to obtain your AVS? MyChart  If the video visit is dropped, the invitation should be resent by: Text to cell phone: [phone]  Will anyone else be joining your video visit? No    Assessment & Plan       ICD-10-CM    1. Abscess of right foot  L02.611 Orthopedic  Referral      2. Type 2 diabetes mellitus with stage 3b chronic kidney disease, with long-term current use of insulin (H)  E11.22 Orthopedic  Referral    N18.32 Basic metabolic panel  (Ca, Cl, CO2, Creat, Gluc, K, Na, BUN)    Z79.4           1-2. Right foot abscess that was incised twice in a 1 week period in the [place] ER. He unfortunately was not provided antibiotics the initial visit and then was placed on Keflex and doxycycline. The wound is healing although still not closed completely. He will finish the antibiotics and continue to keep clean with warm, soapy water and a gauze wrap. Podiatry referral placed to ensure complete healing and for diabetic foot check. He can half the dose of Norco for pain control and I can provide a short refill if needed. Also, his creatinine was higher than his normal in the ED on 6/25 and he had subsequent IV contrast and antibiotics so I recommend a BMP in the next few days. She will complete an updated NADIA at that point as well as her previous NADIA was never scanned and she is having difficulties scheduling hisi appointments.         Subjective   Diana is a 59 year old, presenting for the following health issues:  ER F/U      6/30/2025     1:29 PM   Additional Questions   Roomed by Malathi   Accompanied by Daughter Yady         6/30/2025     1:29 PM   Patient Reported Additional Medications   Patient reports taking the following new medications keflex, vibramycin, norco 5-325 1 tab q6h prn     Video Start Time: 2:11 pm    His daughter Yady translates during the visit.    HPI      ED/UC Followup:    Facility:   [facility] ED  Date of visit: 06/21/25 and 06/25/25  Reason for visit: abscess of right foot/abscess infection  Current Status: drowsy from the pain medication, seems like his foot is getting better, foot pain ranging from 7/10 early this morning before pain meds to later this morning around 3/10 after pain meds    The wound seems to be healing well with no further redness or purulent drainage. Pain is also improving but the Norco makes him drowsy.    ED Visit Summary 6/25    [facility]   Diana Quezada is a 59 Y male here with erythematous area on the medial side of his right heel concerning for diabetic foot infection. He was endorsing some chills as well., I tried to reperform I&D, got some cottage cheese looking material out of the wound but certainly no purulent pus. CT questions tophi in the area. Overall I think he needs to be admitted because he is having trouble walking on the foot and have podiatry see him.    The overnight hospitalist rejected the patient for admission and though he could follow-up with podiatry outpatient and started on oral antibiotics. I discussed this with the patient and his family and they are okay trying that. I told patient and his daughter to come back if this worsened.    Contact info for podiatrist given.    Disposition   Patient discharged home.     Diagnosis   ICD-10-CM   1. Diabetic foot infection (HCC) E11.628 doxycycline hyclate (VIBRAMYCIN) 100 mg oral Capsule   L08.9 cephalexin (KEFLEX) 500 mg oral Capsule   HYDROcodone-acetaminophen (NORCO) 5-325 mg oral Tablet     2. Tophi M1A.9XX1       Review of Systems  Constitutional, HEENT, cardiovascular, pulmonary, gi and gu systems are negative, except as otherwise noted.      Objective           Vitals:  No vitals were obtained today due to virtual visit.    Physical Exam   GENERAL: alert and no distress  EYES: Eyes grossly normal to inspection.  No discharge or erythema, or obvious scleral/conjunctival  abnormalities.  RESP: No audible wheeze, cough, or visible cyanosis.    SKIN: Right medial foot wound seems to be healing well without obvious erythema or discharge.   NEURO: Cranial nerves grossly intact.  Mentation and speech appropriate for age.  PSYCH: Appropriate affect, tone, and pace of words      Video-Visit Details    Type of service:  Video Visit   Video End Time: 2:22 pm  Originating Location (pt. Location): Home  Distant Location (provider location):  Off-site  Platform used for Video Visit: Alondra  Signed Electronically by: Lenin Jacob PA-C

## 2025-07-01 ENCOUNTER — VIRTUAL VISIT (OUTPATIENT)
Dept: PHARMACY | Facility: OTHER | Age: 60
End: 2025-07-01
Attending: STUDENT IN AN ORGANIZED HEALTH CARE EDUCATION/TRAINING PROGRAM
Payer: MEDICARE

## 2025-07-01 DIAGNOSIS — E78.5 HYPERLIPIDEMIA LDL GOAL <70: ICD-10-CM

## 2025-07-01 DIAGNOSIS — M1A.9XX1 CHRONIC TOPHACEOUS GOUT: Primary | ICD-10-CM

## 2025-07-01 DIAGNOSIS — N18.32 TYPE 2 DIABETES MELLITUS WITH STAGE 3B CHRONIC KIDNEY DISEASE, WITH LONG-TERM CURRENT USE OF INSULIN (H): ICD-10-CM

## 2025-07-01 DIAGNOSIS — E03.9 HYPOTHYROIDISM, UNSPECIFIED TYPE: ICD-10-CM

## 2025-07-01 DIAGNOSIS — E11.22 TYPE 2 DIABETES MELLITUS WITH STAGE 3B CHRONIC KIDNEY DISEASE, WITH LONG-TERM CURRENT USE OF INSULIN (H): ICD-10-CM

## 2025-07-01 DIAGNOSIS — I10 BENIGN ESSENTIAL HYPERTENSION: ICD-10-CM

## 2025-07-01 DIAGNOSIS — Z79.4 TYPE 2 DIABETES MELLITUS WITH STAGE 3B CHRONIC KIDNEY DISEASE, WITH LONG-TERM CURRENT USE OF INSULIN (H): ICD-10-CM

## 2025-07-01 RX ORDER — BENAZEPRIL HYDROCHLORIDE 20 MG/1
20 TABLET ORAL DAILY
COMMUNITY

## 2025-07-01 NOTE — PROGRESS NOTES
Medication Therapy Management (MTM) Encounter    ASSESSMENT:                            Medication Adherence/Access: Once patient is set to start Ilaris will work with team at [facility] for injection training and have patient administer first dose in clinic.    Selene gout  Provided education on Ilaris, Krystexxa, and mycophenolate today including dosing, general administration, side effects (both common/serious), precautions, and monitoring. Discussed risk of serious infection, general process for infusions, and that Ilaris will help with preventing gout flares while Krystexxa will be used for urate acid lowering therapy. Would benefit from starting Ilaris and mycophenolate if Hep B DNA lab negative. Recommend patient wait to start Krystexxa infusions until he has been on mycophenolate for at least 1 month to reduce risk of anti-drug antibody formation to Krystexxa. Recommend continuing allopurinol at current dose and stop 1 week prior to first Krystexxa infusion.    Vaccines  Per ACIP guidelines, patient is eligible for Covid-19, RSV, and Zoster. Recommend deferring RSV until late Summer/early Fall. Did not discuss with patient to avoid overwhelming patient. Will discuss at future visit.    Diabetes  Patient is not meeting A1c goal of < 7%. Recommend continue management with PCP as patient may benefit from addition of GLP-1 agonist like Ozempic or Mounjaro.    Hypertension   Stable. Patient is meeting blood pressure goal of < 140/90 mmHg.    Hyperlipidemia   Patient is not meeting goal LDL <70 mg/dL. Recommend continue management with PCP as patient may benefit from addition of PCSK9 inhibitor as he is already on maximum dose of atorvastatin and ezetimibe. Patient will be due for fasting labs in 1/2026.    Hypothyroidism   Recommend continue plan set by PCP.    PLAN:                            Have labs ordered by Dr. Zavala checked on 7/8/25 at 8:45 AM at [facility] lab  results are reviewed, plan is to start:   Ilaris 150 mg subcutaneous injection every 84 days  Administration Video: [email or web address]  Plan will be to have first dose administration in clinic at [place]  Mycophenolate 500 mg (1 tablet) once daily for 2 weeks, then check labs and if labs are stable increase to 1 tablet twice daily  Krystexxa infusions 1 month after starting mycophenolate    Follow-up: Return in 10 days (on 7/11/2025) for Follow up, with me, using a phone visit.    SUBJECTIVE/OBJECTIVE:                          Diana Quezada is a 59 year old male seen for an initial visit. He was referred to me from Ellen Zavala MD. Patient was accompanied by his daughter, Yady, who is also his PCA. Patient's daughter interpreted and declined having  on call.     Reason for visit: Krystexxa new start for tophaceous gout.    Allergies/ADRs: Reviewed in chart  Past Medical History: Reviewed in chart  Tobacco: He reports that he has never smoked. He has never been exposed to tobacco smoke. He has never used smokeless tobacco.  Alcohol: not currently using  Caffeine: 4-6 cups of coffee per day    Medication Adherence/Access: Patient's daughter, Yady, sets up medications and manages them for patient. PA for Ilaris has been approved, but patient hasn't heard from pharmacy and has not started it yet. Yady requests first dose be administered in clinic and will need injection training. She feels confident she'll be able to administer future doses at home with proper training.    Tophaceous gout  Allopurinol 200 mg once daily  Ilaris 150 mg subcutaneously every 12 weeks - has not started yet    Patient's daughter reports he was seen ED for infection in his right foot. Currently on cephalexin and doxycycline, will finish course in 3 days and infection is resolving without issue. Yady reports patient has tophi on his feet, elbows, and  ankles. Reports he is having more frequent flares and daughter feels likes she has to help him more to complete ADLs. When he has flares he needs to use wheelchair to ambulate, otherwise family tries to keep him mobile when he is not having flare. Patient reports no side effects to current therapy. Plan from his rheumatologist is to start Krystexxa infusions with mycophenolate in place of methotrexate due to patient's reduce renal function.     Last lab monitoring completed: 6/25/2025 at [facility]    Pre-Biologic Screening:   Hep B Surface Antibody Reactive, indicates immunity. (4/25/2025)    Hep B Core Antibody  Reactive (4/25/2025)    Hep B Surface Antigen Non-reactive (4/25/2025)    Hep C Antibody  Non-reactive (4/25/2025)      Vaccinations  Immunization History   Covid-19 vaccine (2254-5139 version)  Due to receive   Influenza (annual) Up-to-date   Pneumococcal  Prevnar-20: 1/20/2025 Up-to-date   Tetanus/Tdap  Up-to-date   Shingrix Due to receive   RSV (only for >= 60 years old)  Due to receive   All patients on biologics should avoid live vaccines (varicella/VZV, intranasal influenza, MMR, or yellow fever vaccine (if traveling))       Diabetes   Jardiance 25 mg once daily  Humalog kwikpen 7 units subcutaneously three times daily  Lantus solostar 15 units subcutaneously once daily  Aspirin 81 mg daily    Patient is not experiencing side effects. Working with his PCP for management and has follow up scheduled for later this month. Per chart review patient previously used Trulicity and stopped it due to insurance no longer covering therapy.     Blood sugar monitoring: reports blood glucose readings are usually 180-190 mg/dL in AM and can go into 300's in evening.  Eye exam is up to date  Foot exam is up to date    Hypertension   Benazepril 20 mg once daily    Patient reports no current medication side effects.     Hyperlipidemia   Atorvastatin 80 mg daily  Ezetimibe 10 mg once daily    Patient reports no  significant myalgias or other side effects.     Hypothyroidism   Levothyroxine 175 mcg once daily     Patient is having the following symptoms: none.      Today's Vitals: There were no vitals taken for this visit.  ----------------  Post Discharge Medication Reconciliation Status: discharge medications reconciled, continue medications without change.    I spent 38 minutes with this patient today. All changes were made via collaborative practice agreement with Ellen Zavala.     A summary of these recommendations was sent via [employer].    Andre Galindo, PharmD  Medication Therapy Management Pharmacist  [facility]  Phone: [phone]    Telemedicine Visit Details  The patient's medications can be safely assessed via a telemedicine encounter.  Type of service:  Telephone visit  Originating Location (pt. Location): Home    Distant Location (provider location):  On-site  Start Time: 2:33 PM  End Time: 3:11 PM     Medication Therapy Recommendations  No medication therapy recommendations to display

## 2025-07-01 NOTE — PATIENT INSTRUCTIONS
"Recommendations from today's MTM visit:                                                    MTM (medication therapy management) is a service provided by a clinical pharmacist designed to help you get the most of out of your medicines.   Today we reviewed what your medicines are for, how to know if they are working, that your medicines are safe and how to make your medicine regimen as easy as possible.      Have labs ordered by Dr. Zavala checked on 7/8/25 at 8:45 AM at [facility]  Once lab results are reviewed, plan is to start:   Ilaris 150 mg subcutaneous injection every 84 days  Administration Video: [email or web address]  Plan will be to have first dose administration in clinic at [place]  Mycophenolate 500 mg (1 tablet) once daily for 2 weeks, then check labs and if labs are stable increase to 1 tablet twice daily  Krystexxa infusions 1 month after starting mycophenolate    Follow-up: Return in 10 days (on 7/11/2025) for Follow up, with me, using a phone visit.    It was great speaking with you today.  I value your experience and would be very thankful for your time in providing feedback in our clinic survey. In the next few days, you may receive an email or text message from [employer] with a link to a survey related to your  clinical pharmacist.\"     To schedule another MTM appointment, please call the clinic directly or you may call the MTM scheduling line at [phone] or toll-free at [phone].     My Clinical Pharmacist's contact information:                                                      Please feel free to contact me with any questions or concerns you have.      Andre Galindo, PharmD  Medication Therapy Management Pharmacist  [facility]  Phone: [phone]   " [phone]

## 2025-07-03 ENCOUNTER — DOCUMENTATION ONLY (OUTPATIENT)
Dept: RHEUMATOLOGY | Facility: CLINIC | Age: 60
End: 2025-07-03
Payer: COMMERCIAL

## 2025-07-03 NOTE — PROGRESS NOTES
Diana Lopez has an upcoming lab appointment:    Future Appointments   Date Time Provider Department Center   7/8/2025  8:45 AM LAB FIRST FLOOR [facility] [place]     Patient is scheduled for the following lab(s): [facility]    There are multiple orders of [facility] from 6/3/25 that have not been signed. Please sign these orders or they will not be able to be drawn at his appointment.    Thank you,  Khalida Woods

## 2025-07-07 ENCOUNTER — PATIENT OUTREACH (OUTPATIENT)
Dept: CARE COORDINATION | Facility: CLINIC | Age: 60
End: 2025-07-07
Payer: COMMERCIAL

## 2025-07-10 ENCOUNTER — APPOINTMENT (OUTPATIENT)
Dept: INTERPRETER SERVICES | Facility: CLINIC | Age: 60
End: 2025-07-10
Payer: COMMERCIAL

## 2025-07-13 DIAGNOSIS — M1A.9XX1 CHRONIC TOPHACEOUS GOUT: Primary | ICD-10-CM

## 2025-07-14 NOTE — PROGRESS NOTES
7/14-LMTCB x1    Ellen Zavala MD to Rheumatology Support Pool         7/13/25 10:21 PM  Please assist in scheduling following up with me

## 2025-07-20 ENCOUNTER — HEALTH MAINTENANCE LETTER (OUTPATIENT)
Age: 60
End: 2025-07-20

## 2025-07-21 ENCOUNTER — TELEPHONE (OUTPATIENT)
Dept: PHARMACY | Facility: OTHER | Age: 60
End: 2025-07-21
Payer: COMMERCIAL

## 2025-07-21 NOTE — TELEPHONE ENCOUNTER
Pt is due for follow up with Andre DEGROOT     Using an , call placed to pt to initiate scheduling on 07/21/25    Outcome: ALEXA

## 2025-07-28 ENCOUNTER — HOSPITAL ENCOUNTER (EMERGENCY)
Facility: CLINIC | Age: 60
Discharge: HOME | End: 2025-07-28
Attending: NURSE PRACTITIONER
Payer: COMMERCIAL

## 2025-07-28 ENCOUNTER — OFFICE VISIT (OUTPATIENT)
Dept: FAMILY MEDICINE | Facility: OTHER | Age: 60
End: 2025-07-28
Payer: COMMERCIAL

## 2025-07-28 ENCOUNTER — TELEPHONE (OUTPATIENT)
Dept: FAMILY MEDICINE | Facility: OTHER | Age: 60
End: 2025-07-28

## 2025-07-28 VITALS
WEIGHT: 115.96 LBS | HEART RATE: 72 BPM | DIASTOLIC BLOOD PRESSURE: 84 MMHG | BODY MASS INDEX: 24.24 KG/M2 | RESPIRATION RATE: 18 BRPM | OXYGEN SATURATION: 98 % | SYSTOLIC BLOOD PRESSURE: 138 MMHG | TEMPERATURE: 97.7 F

## 2025-07-28 VITALS
BODY MASS INDEX: 24.24 KG/M2 | OXYGEN SATURATION: 95 % | RESPIRATION RATE: 21 BRPM | SYSTOLIC BLOOD PRESSURE: 110 MMHG | TEMPERATURE: 97.6 F | WEIGHT: 116 LBS | HEART RATE: 97 BPM | DIASTOLIC BLOOD PRESSURE: 60 MMHG

## 2025-07-28 DIAGNOSIS — E11.22 TYPE 2 DIABETES MELLITUS WITH STAGE 3B CHRONIC KIDNEY DISEASE, WITH LONG-TERM CURRENT USE OF INSULIN (H): Primary | ICD-10-CM

## 2025-07-28 DIAGNOSIS — M1A.9XX1 CHRONIC TOPHACEOUS GOUT: ICD-10-CM

## 2025-07-28 DIAGNOSIS — R11.0 NAUSEA: ICD-10-CM

## 2025-07-28 DIAGNOSIS — I10 BENIGN ESSENTIAL HYPERTENSION: ICD-10-CM

## 2025-07-28 DIAGNOSIS — L97.511 ULCER OF RIGHT FOOT, LIMITED TO BREAKDOWN OF SKIN (H): ICD-10-CM

## 2025-07-28 DIAGNOSIS — N18.32 TYPE 2 DIABETES MELLITUS WITH STAGE 3B CHRONIC KIDNEY DISEASE, WITH LONG-TERM CURRENT USE OF INSULIN (H): Primary | ICD-10-CM

## 2025-07-28 DIAGNOSIS — Z79.4 TYPE 2 DIABETES MELLITUS WITH STAGE 3B CHRONIC KIDNEY DISEASE, WITH LONG-TERM CURRENT USE OF INSULIN (H): Primary | ICD-10-CM

## 2025-07-28 DIAGNOSIS — E11.65 UNCONTROLLED TYPE 2 DIABETES MELLITUS WITH HYPERGLYCEMIA (H): Primary | ICD-10-CM

## 2025-07-28 DIAGNOSIS — Z12.5 SCREENING FOR PROSTATE CANCER: ICD-10-CM

## 2025-07-28 DIAGNOSIS — E78.5 HYPERLIPIDEMIA LDL GOAL <70: ICD-10-CM

## 2025-07-28 DIAGNOSIS — F33.0 MILD RECURRENT MAJOR DEPRESSION: ICD-10-CM

## 2025-07-28 DIAGNOSIS — J44.9 CHRONIC OBSTRUCTIVE PULMONARY DISEASE, UNSPECIFIED COPD TYPE (H): ICD-10-CM

## 2025-07-28 LAB
ALBUMIN SERPL BCG-MCNC: 3.8 G/DL (ref 3.5–5.2)
ALBUMIN SERPL BCG-MCNC: 3.8 G/DL (ref 3.5–5.2)
ALBUMIN UR-MCNC: NEGATIVE MG/DL
ALP SERPL-CCNC: 139 U/L (ref 40–150)
ALP SERPL-CCNC: 157 U/L (ref 40–150)
ALT SERPL W P-5'-P-CCNC: 46 U/L (ref 0–70)
ALT SERPL W P-5'-P-CCNC: 53 U/L (ref 0–70)
ANION GAP SERPL CALCULATED.3IONS-SCNC: 12 MMOL/L (ref 7–15)
ANION GAP SERPL CALCULATED.3IONS-SCNC: 14 MMOL/L (ref 7–15)
APPEARANCE UR: CLEAR
AST SERPL W P-5'-P-CCNC: 34 U/L (ref 0–45)
AST SERPL W P-5'-P-CCNC: 43 U/L (ref 0–45)
B-OH-BUTYR SERPL-SCNC: <0.18 MMOL/L
BASE EXCESS BLDV CALC-SCNC: -2.3 MMOL/L (ref -3–3)
BASOPHILS # BLD AUTO: 0.1 10E3/UL (ref 0–0.2)
BASOPHILS NFR BLD AUTO: 1 %
BILIRUB SERPL-MCNC: 0.5 MG/DL
BILIRUB SERPL-MCNC: 0.7 MG/DL
BILIRUB UR QL STRIP: NEGATIVE
BUN SERPL-MCNC: 29.9 MG/DL (ref 8–23)
BUN SERPL-MCNC: 31.7 MG/DL (ref 8–23)
CALCIUM SERPL-MCNC: 9 MG/DL (ref 8.8–10.4)
CALCIUM SERPL-MCNC: 9.1 MG/DL (ref 8.8–10.4)
CHLORIDE SERPL-SCNC: 100 MMOL/L (ref 98–107)
CHLORIDE SERPL-SCNC: 101 MMOL/L (ref 98–107)
COLOR UR AUTO: ABNORMAL
CREAT SERPL-MCNC: 1.79 MG/DL (ref 0.67–1.17)
CREAT SERPL-MCNC: 1.99 MG/DL (ref 0.67–1.17)
CREAT UR-MCNC: 48.6 MG/DL
EGFRCR SERPLBLD CKD-EPI 2021: 37 ML/MIN/1.73M2
EGFRCR SERPLBLD CKD-EPI 2021: 42 ML/MIN/1.73M2
EOSINOPHIL # BLD AUTO: 0.2 10E3/UL (ref 0–0.7)
EOSINOPHIL NFR BLD AUTO: 3 %
ERYTHROCYTE [DISTWIDTH] IN BLOOD BY AUTOMATED COUNT: 13.4 % (ref 10–15)
ERYTHROCYTE [DISTWIDTH] IN BLOOD BY AUTOMATED COUNT: 13.7 % (ref 10–15)
EST. AVERAGE GLUCOSE BLD GHB EST-MCNC: 349 MG/DL
GLUCOSE BLDC GLUCOMTR-MCNC: 306 MG/DL (ref 70–99)
GLUCOSE BLDC GLUCOMTR-MCNC: 399 MG/DL (ref 70–99)
GLUCOSE SERPL-MCNC: 396 MG/DL (ref 70–99)
GLUCOSE SERPL-MCNC: 653 MG/DL (ref 70–99)
GLUCOSE UR STRIP-MCNC: >1000 MG/DL
HBA1C MFR BLD: 13.8 % (ref 0–5.6)
HCO3 BLDV-SCNC: 24 MMOL/L (ref 21–28)
HCO3 SERPL-SCNC: 18 MMOL/L (ref 22–29)
HCO3 SERPL-SCNC: 21 MMOL/L (ref 22–29)
HCT VFR BLD AUTO: 41.5 % (ref 40–53)
HCT VFR BLD AUTO: 42.8 % (ref 40–53)
HGB BLD-MCNC: 14.5 G/DL (ref 13.3–17.7)
HGB BLD-MCNC: 15.3 G/DL (ref 13.3–17.7)
HGB UR QL STRIP: NEGATIVE
HIV 1+2 AB+HIV1 P24 AG SERPL QL IA: NONREACTIVE
IMM GRANULOCYTES # BLD: 0 10E3/UL
IMM GRANULOCYTES NFR BLD: 1 %
KETONES UR STRIP-MCNC: NEGATIVE MG/DL
LACTATE SERPL-SCNC: 1.8 MMOL/L (ref 0.7–2)
LEUKOCYTE ESTERASE UR QL STRIP: NEGATIVE
LIPASE SERPL-CCNC: 60 U/L (ref 13–60)
LYMPHOCYTES # BLD AUTO: 1.8 10E3/UL (ref 0.8–5.3)
LYMPHOCYTES NFR BLD AUTO: 28 %
MCH RBC QN AUTO: 28.8 PG (ref 26.5–33)
MCH RBC QN AUTO: 29.4 PG (ref 26.5–33)
MCHC RBC AUTO-ENTMCNC: 34.9 G/DL (ref 31.5–36.5)
MCHC RBC AUTO-ENTMCNC: 35.7 G/DL (ref 31.5–36.5)
MCV RBC AUTO: 82 FL (ref 78–100)
MCV RBC AUTO: 83 FL (ref 78–100)
MICROALBUMIN UR-MCNC: <12 MG/L
MICROALBUMIN/CREAT UR: NORMAL MG/G{CREAT}
MONOCYTES # BLD AUTO: 0.5 10E3/UL (ref 0–1.3)
MONOCYTES NFR BLD AUTO: 7 %
MUCOUS THREADS #/AREA URNS LPF: PRESENT /LPF
NEUTROPHILS # BLD AUTO: 4 10E3/UL (ref 1.6–8.3)
NEUTROPHILS NFR BLD AUTO: 61 %
NITRATE UR QL: NEGATIVE
NRBC # BLD AUTO: 0 10E3/UL
NRBC BLD AUTO-RTO: 0 /100
O2/TOTAL GAS SETTING VFR VENT: 21 %
OXYHGB MFR BLDV: 66 % (ref 70–75)
PCO2 BLDV: 44 MM HG (ref 40–50)
PH BLDV: 7.34 [PH] (ref 7.32–7.43)
PH UR STRIP: 5.5 [PH] (ref 5–7)
PLATELET # BLD AUTO: 132 10E3/UL (ref 150–450)
PLATELET # BLD AUTO: 134 10E3/UL (ref 150–450)
PO2 BLDV: 35 MM HG (ref 25–47)
POTASSIUM SERPL-SCNC: 4 MMOL/L (ref 3.4–5.3)
POTASSIUM SERPL-SCNC: 4.3 MMOL/L (ref 3.4–5.3)
PROT SERPL-MCNC: 6.5 G/DL (ref 6.4–8.3)
PROT SERPL-MCNC: 6.5 G/DL (ref 6.4–8.3)
PSA SERPL DL<=0.01 NG/ML-MCNC: 1.07 NG/ML (ref 0–4.5)
PTH-INTACT SERPL-MCNC: 45 PG/ML (ref 15–65)
RBC # BLD AUTO: 5.03 10E6/UL (ref 4.4–5.9)
RBC # BLD AUTO: 5.21 10E6/UL (ref 4.4–5.9)
RBC URINE: <1 /HPF
SAO2 % BLDV: 66.8 % (ref 70–75)
SODIUM SERPL-SCNC: 132 MMOL/L (ref 135–145)
SODIUM SERPL-SCNC: 134 MMOL/L (ref 135–145)
SP GR UR STRIP: 1.02 (ref 1–1.03)
T4 FREE SERPL-MCNC: 1.09 NG/DL (ref 0.9–1.7)
TSH SERPL DL<=0.005 MIU/L-ACNC: 6.38 UIU/ML (ref 0.3–4.2)
URATE SERPL-MCNC: 9.4 MG/DL (ref 3.4–7)
UROBILINOGEN UR STRIP-MCNC: NORMAL MG/DL
WBC # BLD AUTO: 6.6 10E3/UL (ref 4–11)
WBC # BLD AUTO: 7.4 10E3/UL (ref 4–11)
WBC URINE: <1 /HPF

## 2025-07-28 PROCEDURE — 1125F AMNT PAIN NOTED PAIN PRSNT: CPT | Performed by: PHYSICIAN ASSISTANT

## 2025-07-28 PROCEDURE — 84443 ASSAY THYROID STIM HORMONE: CPT | Performed by: NURSE PRACTITIONER

## 2025-07-28 PROCEDURE — 85027 COMPLETE CBC AUTOMATED: CPT | Performed by: NURSE PRACTITIONER

## 2025-07-28 PROCEDURE — 250N000011 HC RX IP 250 OP 636: Performed by: NURSE PRACTITIONER

## 2025-07-28 PROCEDURE — 36415 COLL VENOUS BLD VENIPUNCTURE: CPT | Performed by: NURSE PRACTITIONER

## 2025-07-28 PROCEDURE — 3074F SYST BP LT 130 MM HG: CPT | Performed by: PHYSICIAN ASSISTANT

## 2025-07-28 PROCEDURE — 83036 HEMOGLOBIN GLYCOSYLATED A1C: CPT | Performed by: PHYSICIAN ASSISTANT

## 2025-07-28 PROCEDURE — 84155 ASSAY OF PROTEIN SERUM: CPT | Performed by: NURSE PRACTITIONER

## 2025-07-28 PROCEDURE — 3046F HEMOGLOBIN A1C LEVEL >9.0%: CPT | Performed by: PHYSICIAN ASSISTANT

## 2025-07-28 PROCEDURE — 36415 COLL VENOUS BLD VENIPUNCTURE: CPT | Performed by: PHYSICIAN ASSISTANT

## 2025-07-28 PROCEDURE — 258N000003 HC RX IP 258 OP 636: Performed by: NURSE PRACTITIONER

## 2025-07-28 PROCEDURE — 82010 KETONE BODYS QUAN: CPT | Performed by: NURSE PRACTITIONER

## 2025-07-28 PROCEDURE — 99284 EMERGENCY DEPT VISIT MOD MDM: CPT | Performed by: NURSE PRACTITIONER

## 2025-07-28 PROCEDURE — 82043 UR ALBUMIN QUANTITATIVE: CPT | Performed by: PHYSICIAN ASSISTANT

## 2025-07-28 PROCEDURE — 96361 HYDRATE IV INFUSION ADD-ON: CPT

## 2025-07-28 PROCEDURE — G2211 COMPLEX E/M VISIT ADD ON: HCPCS | Performed by: PHYSICIAN ASSISTANT

## 2025-07-28 PROCEDURE — 82805 BLOOD GASES W/O2 SATURATION: CPT | Performed by: NURSE PRACTITIONER

## 2025-07-28 PROCEDURE — 3078F DIAST BP <80 MM HG: CPT | Performed by: PHYSICIAN ASSISTANT

## 2025-07-28 PROCEDURE — 83970 ASSAY OF PARATHORMONE: CPT | Performed by: PHYSICIAN ASSISTANT

## 2025-07-28 PROCEDURE — 83690 ASSAY OF LIPASE: CPT | Performed by: NURSE PRACTITIONER

## 2025-07-28 PROCEDURE — 83605 ASSAY OF LACTIC ACID: CPT | Performed by: NURSE PRACTITIONER

## 2025-07-28 PROCEDURE — 87517 HEPATITIS B DNA QUANT: CPT | Performed by: PHYSICIAN ASSISTANT

## 2025-07-28 PROCEDURE — 99284 EMERGENCY DEPT VISIT MOD MDM: CPT | Mod: 25 | Performed by: NURSE PRACTITIONER

## 2025-07-28 PROCEDURE — G0103 PSA SCREENING: HCPCS | Performed by: PHYSICIAN ASSISTANT

## 2025-07-28 PROCEDURE — 99215 OFFICE O/P EST HI 40 MIN: CPT | Performed by: PHYSICIAN ASSISTANT

## 2025-07-28 PROCEDURE — 81003 URINALYSIS AUTO W/O SCOPE: CPT | Performed by: NURSE PRACTITIONER

## 2025-07-28 PROCEDURE — 80051 ELECTROLYTE PANEL: CPT | Performed by: NURSE PRACTITIONER

## 2025-07-28 PROCEDURE — 80053 COMPREHEN METABOLIC PANEL: CPT | Performed by: PHYSICIAN ASSISTANT

## 2025-07-28 PROCEDURE — 96374 THER/PROPH/DIAG INJ IV PUSH: CPT

## 2025-07-28 PROCEDURE — 84439 ASSAY OF FREE THYROXINE: CPT | Performed by: NURSE PRACTITIONER

## 2025-07-28 PROCEDURE — 84550 ASSAY OF BLOOD/URIC ACID: CPT | Performed by: PHYSICIAN ASSISTANT

## 2025-07-28 PROCEDURE — 96127 BRIEF EMOTIONAL/BEHAV ASSMT: CPT | Performed by: PHYSICIAN ASSISTANT

## 2025-07-28 PROCEDURE — 82962 GLUCOSE BLOOD TEST: CPT

## 2025-07-28 PROCEDURE — 82570 ASSAY OF URINE CREATININE: CPT | Performed by: PHYSICIAN ASSISTANT

## 2025-07-28 PROCEDURE — 85025 COMPLETE CBC W/AUTO DIFF WBC: CPT | Performed by: PHYSICIAN ASSISTANT

## 2025-07-28 PROCEDURE — 87389 HIV-1 AG W/HIV-1&-2 AB AG IA: CPT | Performed by: PHYSICIAN ASSISTANT

## 2025-07-28 RX ORDER — INSULIN GLARGINE 100 [IU]/ML
17 INJECTION, SOLUTION SUBCUTANEOUS AT BEDTIME
Qty: 30 ML | Refills: 1 | Status: SHIPPED | OUTPATIENT
Start: 2025-07-28

## 2025-07-28 RX ORDER — LEVOTHYROXINE SODIUM 150 UG/1
150 TABLET ORAL
Qty: 90 TABLET | Refills: 1 | Status: SHIPPED | OUTPATIENT
Start: 2025-07-28

## 2025-07-28 RX ORDER — ONDANSETRON 2 MG/ML
4 INJECTION INTRAMUSCULAR; INTRAVENOUS ONCE
Status: COMPLETED | OUTPATIENT
Start: 2025-07-28 | End: 2025-07-28

## 2025-07-28 RX ORDER — ESCITALOPRAM OXALATE 20 MG/1
20 TABLET ORAL DAILY
Qty: 90 TABLET | Refills: 1 | Status: SHIPPED | OUTPATIENT
Start: 2025-07-28

## 2025-07-28 RX ADMIN — SODIUM CHLORIDE 1000 ML: 0.9 INJECTION, SOLUTION INTRAVENOUS at 19:25

## 2025-07-28 RX ADMIN — ONDANSETRON 4 MG: 2 INJECTION, SOLUTION INTRAMUSCULAR; INTRAVENOUS at 19:25

## 2025-07-28 ASSESSMENT — ENCOUNTER SYMPTOMS
DIZZINESS: 0
WEAKNESS: 0
VOMITING: 0
CONSTIPATION: 0
ABDOMINAL PAIN: 0
CHILLS: 0
FATIGUE: 1
DIARRHEA: 0
FEVER: 0
SHORTNESS OF BREATH: 0
FREQUENCY: 1
NAUSEA: 1
COUGH: 0
APPETITE CHANGE: 0
DYSURIA: 0
HEADACHES: 0

## 2025-07-28 ASSESSMENT — PATIENT HEALTH QUESTIONNAIRE - PHQ9
SUM OF ALL RESPONSES TO PHQ QUESTIONS 1-9: 19
10. IF YOU CHECKED OFF ANY PROBLEMS, HOW DIFFICULT HAVE THESE PROBLEMS MADE IT FOR YOU TO DO YOUR WORK, TAKE CARE OF THINGS AT HOME, OR GET ALONG WITH OTHER PEOPLE: VERY DIFFICULT
SUM OF ALL RESPONSES TO PHQ QUESTIONS 1-9: 19

## 2025-07-28 ASSESSMENT — COLUMBIA-SUICIDE SEVERITY RATING SCALE - C-SSRS
2. HAVE YOU ACTUALLY HAD ANY THOUGHTS OF KILLING YOURSELF IN THE PAST MONTH?: NO
6. HAVE YOU EVER DONE ANYTHING, STARTED TO DO ANYTHING, OR PREPARED TO DO ANYTHING TO END YOUR LIFE?: NO
1. IN THE PAST MONTH, HAVE YOU WISHED YOU WERE DEAD OR WISHED YOU COULD GO TO SLEEP AND NOT WAKE UP?: NO

## 2025-07-28 ASSESSMENT — ACTIVITIES OF DAILY LIVING (ADL)
ADLS_ACUITY_SCORE: 41
ADLS_ACUITY_SCORE: 41

## 2025-07-28 ASSESSMENT — PAIN SCALES - GENERAL: PAINLEVEL_OUTOF10: MODERATE PAIN (6)

## 2025-07-28 NOTE — TELEPHONE ENCOUNTER
Date/time of call received from lab: 07/28/25 at 4:12 PM.    Lab test:  Glucose    Lab value:  653    Ordering provider name: Maria Victoria    Ordering provider department: [facility]    Primary Care Provider: Lenin Jacob     Result managed by: Clinic Hours: Primary Care clinic RN staff. Was test ordered in Primary Care?: Yes, ordered in Primary Care Primary Care: route telephone encounter high priority to ordering provider and ordering provider's clinic Nurse dion Ocampo RN on 7/28/2025 at 4:13 PM

## 2025-07-28 NOTE — PATIENT INSTRUCTIONS
Will slightly increase the Lantus to 17 units nightly.  Make sure he is eating larger meals throughout the day with good proteins and vegetables to avoid low readings.  Follow-up with the diabetic educator.    Will refer to podiatry.  Keep the foot area clean and dry.    Will increase the Lexapro to 20 mg for improvement in your mood.    Follow-up in 2 months

## 2025-07-28 NOTE — ED TRIAGE NOTES
Patient presents with daughter for concern of high blood sugars in the clinic. Had labs drawn and shown a reading in the 600s. Patient reports feeling lethargic and nauseous today as well. Denies vomiting. Is a type 2 diabetic, insulin managed. Did use his insulin this morning. Daughter states he has been more stressed lately, he feels like he is losing his independence due to health decline.     Triage Assessment (Adult)       Row Name 07/28/25 [id number]          Triage Assessment    Airway WDL WDL        Respiratory WDL    Respiratory WDL WDL        Skin Circulation/Temperature WDL    Skin Circulation/Temperature WDL WDL        Cardiac WDL    Cardiac WDL WDL        Peripheral/Neurovascular WDL    Peripheral Neurovascular WDL WDL        Cognitive/Neuro/Behavioral WDL    Cognitive/Neuro/Behavioral WDL WDL

## 2025-07-28 NOTE — ED PROVIDER NOTES
History     Chief Complaint   Patient presents with    Hyperglycemia     HPI  Diana Quezada is a 60 year old male with history of hypertension, T2DM, hyperlipidemia, COPD, chronic bilateral low back pain, gout, and hypothyroidism who presents from clinic for evaluation of hyperglycemia.  He was seen in clinic for his routine visit related to his diabetes.  His hemoglobin A1c in clinic is 13.8.  His blood sugar was 653.  His Lantus was increased from 15 to 17 units every night and recommending he try to eat more meals high in protein and vegetables and low in carbohydrates.  He is to continue his Humalog 7 units prior to meals and is being referred back to diabetic educator.    Given his high blood sugar he was recommended to come to the emergency department for further evaluation to ensure he is not having DKA.  Additionally his creatinine is up to 1.99 and 3 months ago was 1.63.      Allergies:  Allergies   Allergen Reactions    Acetaminophen-Codeine Other (See Comments)     Tired and sleepy       Problem List:    Patient Active Problem List    Diagnosis Date Noted    Type 2 diabetes mellitus with stage 3b chronic kidney disease, with long-term current use of insulin (H) 01/20/2025     Priority: Medium    Benign essential hypertension 01/20/2025     Priority: Medium    Hyperlipidemia LDL goal <70 01/20/2025     Priority: Medium    Chronic tophaceous gout 01/20/2025     Priority: Medium    Hypothyroidism, unspecified type 01/20/2025     Priority: Medium    Mild recurrent major depression 01/20/2025     Priority: Medium    Chronic obstructive pulmonary disease, unspecified COPD type (H) 01/20/2025     Priority: Medium    Impaired mobility 01/20/2025     Priority: Medium    Chronic bilateral low back pain without sciatica 01/20/2025     Priority: Medium        Past Medical History:    Past Medical History:   Diagnosis Date    Diabetes (H)     Hypertension        Past Surgical History:    Past Surgical History:   Procedure  "Laterality Date    CATARACT IOL, RT/LT         Family History:    No family history on file.    Social History:  Marital Status:  Legally  [3]  Social History     Tobacco Use    Smoking status: Never     Passive exposure: Never    Smokeless tobacco: Never   Vaping Use    Vaping status: Never Used   Substance Use Topics    Alcohol use: Not Currently    Drug use: Not Currently        Medications:    allopurinol (ZYLOPRIM) 100 MG tablet  aspirin 81 MG EC tablet  atorvastatin (LIPITOR) 80 MG tablet  benazepril (LOTENSIN) 20 MG tablet  blood glucose (NO BRAND SPECIFIED) test strip  blood glucose monitoring (NO BRAND SPECIFIED) meter device kit  canakinumab (ILARIS) 150 MG/ML injection  Continuous Glucose Sensor (FREESTYLE ELPIDIO 2 SENSOR) MISC  empagliflozin (JARDIANCE) 25 MG TABS tablet  escitalopram (LEXAPRO) 20 MG tablet  ezetimibe (ZETIA) 10 MG tablet  gabapentin (NEURONTIN) 300 MG capsule  HUMALOG KWIKPEN 100 UNIT/ML soln  insulin pen needle (31G X 5 MM) 31G X 5 MM miscellaneous  ipratropium-albuterol (COMBIVENT RESPIMAT)  MCG/ACT inhaler  LANTUS SOLOSTAR 100 UNIT/ML soln  levothyroxine (SYNTHROID/LEVOTHROID) 150 MCG tablet  Needle, Disp, (BD ECLIPSE NEEDLE) 27G X 1/2\" MISC  Syringe/Needle, Disp, (BD LUER-LOCK SYRINGE) 18G X 1-1/2\" 3 ML MISC  thin (NO BRAND SPECIFIED) lancets          Review of Systems   Constitutional:  Positive for fatigue. Negative for appetite change, chills and fever.   HENT:  Negative for congestion.    Respiratory:  Negative for cough and shortness of breath.    Cardiovascular:  Negative for chest pain and leg swelling.   Gastrointestinal:  Positive for nausea. Negative for abdominal pain, constipation, diarrhea and vomiting.   Genitourinary:  Positive for frequency. Negative for dysuria.   Neurological:  Negative for dizziness, weakness and headaches.   All other systems reviewed and are negative.      Physical Exam   BP: 127/79  Pulse: 84  Temp: 97.7  F (36.5  C)  Resp: " 18  Weight: 52.6 kg (115 lb 15.4 oz)  SpO2: 100 %      Physical Exam  Constitutional:       General: He is not in acute distress.     Appearance: Normal appearance. He is well-developed. He is not ill-appearing.   HENT:      Head: Normocephalic and atraumatic.      Right Ear: External ear normal.      Left Ear: External ear normal.      Nose: Nose normal.      Mouth/Throat:      Mouth: Mucous membranes are moist.   Eyes:      Conjunctiva/sclera: Conjunctivae normal.   Cardiovascular:      Rate and Rhythm: Normal rate and regular rhythm.      Heart sounds: Normal heart sounds. No murmur heard.  Pulmonary:      Effort: Pulmonary effort is normal. No respiratory distress.      Breath sounds: Normal breath sounds.   Abdominal:      General: Bowel sounds are normal. There is no distension.      Palpations: Abdomen is soft.      Tenderness: There is no abdominal tenderness.   Musculoskeletal:         General: Normal range of motion.      Right lower leg: No edema.      Left lower leg: No edema.   Skin:     General: Skin is warm and dry.      Findings: No rash.   Neurological:      General: No focal deficit present.      Mental Status: He is alert and oriented to person, place, and time.         ED Course     ED Course as of 07/28/25 2108 Mon Jul 28, 2025 1957 Glucose 396 on labs here. Serum ketones normal. No acidosis. No strong evidence of DKA.   Creat 1.79, likely related to his poorly controlled diabetes.     Procedures            Recent Results (from the past 24 hours)   Hemoglobin A1c   Result Value Ref Range    Estimated Average Glucose 349 (H) <117 mg/dL    Hemoglobin A1C 13.8 (H) 0.0 - 5.6 %   Albumin Random Urine Quantitative with Creat Ratio   Result Value Ref Range    Creatinine Urine mg/dL 48.6 mg/dL    Albumin Urine mg/L <12.0 mg/L    Albumin Urine mg/g Cr     Parathyroid Hormone Intact   Result Value Ref Range    Parathyroid Hormone Intact 45 15 - 65 pg/mL    Narrative    This result was obtained with  the Roche Elecsys PTH STAT assay.   This reference range differs from PTH assays used in other [facility] laboratories.   HIV Antigen Antibody Combo   Result Value Ref Range    HIV Antigen Antibody Combo Nonreactive Nonreactive   CBC with platelets   Result Value Ref Range    WBC Count 7.4 4.0 - 11.0 10e3/uL    RBC Count 5.21 4.40 - 5.90 10e6/uL    Hemoglobin 15.3 13.3 - 17.7 g/dL    Hematocrit 42.8 40.0 - 53.0 %    MCV 82 78 - 100 fL    MCH 29.4 26.5 - 33.0 pg    MCHC 35.7 31.5 - 36.5 g/dL    RDW 13.4 10.0 - 15.0 %    Platelet Count 134 (L) 150 - 450 10e3/uL   Uric acid   Result Value Ref Range    Uric Acid 9.4 (H) 3.4 - 7.0 mg/dL   Comprehensive metabolic panel (BMP + Alb, Alk Phos, ALT, AST, Total. Bili, TP)   Result Value Ref Range    Sodium 132 (L) 135 - 145 mmol/L    Potassium 4.0 3.4 - 5.3 mmol/L    Carbon Dioxide (CO2) 18 (L) 22 - 29 mmol/L    Anion Gap 14 7 - 15 mmol/L    Urea Nitrogen 31.7 (H) 8.0 - 23.0 mg/dL    Creatinine 1.99 (H) 0.67 - 1.17 mg/dL    GFR Estimate 37 (L) >60 mL/min/1.73m2    Calcium 9.0 8.8 - 10.4 mg/dL    Chloride 100 98 - 107 mmol/L    Glucose 653 (HH) 70 - 99 mg/dL    Alkaline Phosphatase 139 40 - 150 U/L    AST 34 0 - 45 U/L    ALT 46 0 - 70 U/L    Protein Total 6.5 6.4 - 8.3 g/dL    Albumin 3.8 3.5 - 5.2 g/dL    Bilirubin Total 0.7 <=1.2 mg/dL   PSA, screen   Result Value Ref Range    Prostate Specific Antigen Screen 1.07 0.00 - 4.50 ng/mL    Narrative    This result is obtained using the Roche Elecsys total PSA method on the saurabh e601 immunoassay analyzer, which is an ultrasensitive method. Results obtained with different assay methods or kits cannot be used interchangeably.  This test is intended for initial prostate cancer screening. PSA values exceeding the age-specific limits are suspicious for prostate disease, but additional testing, such as prostate biopsy, is needed to diagnose prostate pathology. The American Cancer Society recommends annual examination with  digital rectal examination and serum PSA beginning at age 50 and for men with a life expectancy of at least 10 years after detection of prostate cancer. For men in high-risk groups, such as  Americans or men with a first-degree relative diagnosed at a younger age, testing should begin at a younger age. It is generally recommended that information be provided to patients about the benefits and limitations of testing and treatment so they can make informed decisions.   Glucose by meter   Result Value Ref Range    GLUCOSE BY METER POCT 399 (H) 70 - 99 mg/dL   CBC with Platelets and Differential (Limited Occurrences)    Narrative    The following orders were created for panel order CBC with Platelets and Differential (Limited Occurrences).  Procedure                               Abnormality         Status                     ---------                               -----------         ------                     CBC with platelets and ...[[id number]]  Abnormal            Final result                 Please view results for these tests on the individual orders.   Lactic acid whole blood with 1x repeat in 2 hr when >2   Result Value Ref Range    Lactic Acid, Initial 1.8 0.7 - 2.0 mmol/L   Lipase   Result Value Ref Range    Lipase 60 13 - 60 U/L   Comprehensive Metabolic Panel (Limited Occurrences)   Result Value Ref Range    Sodium 134 (L) 135 - 145 mmol/L    Potassium 4.3 3.4 - 5.3 mmol/L    Carbon Dioxide (CO2) 21 (L) 22 - 29 mmol/L    Anion Gap 12 7 - 15 mmol/L    Urea Nitrogen 29.9 (H) 8.0 - 23.0 mg/dL    Creatinine 1.79 (H) 0.67 - 1.17 mg/dL    GFR Estimate 42 (L) >60 mL/min/1.73m2    Calcium 9.1 8.8 - 10.4 mg/dL    Chloride 101 98 - 107 mmol/L    Glucose 396 (H) 70 - 99 mg/dL    Alkaline Phosphatase 157 (H) 40 - 150 U/L    AST 43 0 - 45 U/L    ALT 53 0 - 70 U/L    Protein Total 6.5 6.4 - 8.3 g/dL    Albumin 3.8 3.5 - 5.2 g/dL    Bilirubin Total 0.5 <=1.2 mg/dL   Blood gas venous   Result Value Ref Range    pH  Venous 7.34 7.32 - 7.43    pCO2 Venous 44 40 - 50 mm Hg    pO2 Venous 35 25 - 47 mm Hg    Bicarbonate Venous 24 21 - 28 mmol/L    Base Excess/Deficit Venous -2.3 -3.0 - 3.0 mmol/L    FIO2 21     Oxyhemoglobin Venous 66 (L) 70 - 75 %    O2 Sat, Venous 66.8 (L) 70.0 - 75.0 %    Narrative    In healthy individuals, oxyhemoglobin (O2Hb) and oxygen saturation (SO2) are approximately equal. In the presence of dyshemoglobins, oxyhemoglobin can be considerably lower than oxygen saturation.   Ketone Beta-Hydroxybutyrate Quantitative   Result Value Ref Range    Ketone (Beta-Hydroxybutyrate) Quantitative <0.18 <=0.30 mmol/L   TSH with free T4 reflex   Result Value Ref Range    TSH 6.38 (H) 0.30 - 4.20 uIU/mL   CBC with platelets and differential   Result Value Ref Range    WBC Count 6.6 4.0 - 11.0 10e3/uL    RBC Count 5.03 4.40 - 5.90 10e6/uL    Hemoglobin 14.5 13.3 - 17.7 g/dL    Hematocrit 41.5 40.0 - 53.0 %    MCV 83 78 - 100 fL    MCH 28.8 26.5 - 33.0 pg    MCHC 34.9 31.5 - 36.5 g/dL    RDW 13.7 10.0 - 15.0 %    Platelet Count 132 (L) 150 - 450 10e3/uL    % Neutrophils 61 %    % Lymphocytes 28 %    % Monocytes 7 %    % Eosinophils 3 %    % Basophils 1 %    % Immature Granulocytes 1 %    NRBCs per 100 WBC 0 <1 /100    Absolute Neutrophils 4.0 1.6 - 8.3 10e3/uL    Absolute Lymphocytes 1.8 0.8 - 5.3 10e3/uL    Absolute Monocytes 0.5 0.0 - 1.3 10e3/uL    Absolute Eosinophils 0.2 0.0 - 0.7 10e3/uL    Absolute Basophils 0.1 0.0 - 0.2 10e3/uL    Absolute Immature Granulocytes 0.0 <=0.4 10e3/uL    Absolute NRBCs 0.0 10e3/uL   T4 free   Result Value Ref Range    Free T4 1.09 0.90 - 1.70 ng/dL   UA with Microscopic reflex to Culture    Specimen: Urine, Clean Catch   Result Value Ref Range    Color Urine Straw Colorless, Straw, Light Yellow, Yellow    Appearance Urine Clear Clear    Glucose Urine >1000 (A) Negative mg/dL    Bilirubin Urine Negative Negative    Ketones Urine Negative Negative mg/dL    Specific Gravity Urine 1.016  1.003 - 1.035    Blood Urine Negative Negative    pH Urine 5.5 5.0 - 7.0    Protein Albumin Urine Negative Negative mg/dL    Urobilinogen Urine Normal Normal mg/dL    Nitrite Urine Negative Negative    Leukocyte Esterase Urine Negative Negative    Mucus Urine Present (A) None Seen /LPF    RBC Urine <1 <=2 /HPF    WBC Urine <1 <=5 /HPF    Narrative    Urine Culture not indicated   Glucose by meter   Result Value Ref Range    GLUCOSE BY METER POCT 306 (H) 70 - 99 mg/dL       Medications   sodium chloride 0.9% BOLUS 1,000 mL (0 mLs Intravenous Stopped 7/28/25 2025)   ondansetron (ZOFRAN) injection 4 mg (4 mg Intravenous $Given 7/28/25 1925)       Assessments & Plan (with Medical Decision Making)     60-year-old male who presents with his daughter for evaluation of hyperglycemia in the setting of poorly managed T2DM.  He was seen in clinic earlier today and had blood sugar in the 600s.  Hemoglobin A1c 13.8.  He has been suffering from gout and right foot ulcer.  He has been feeling poorly/fatigued for last few months.  He has been feeling intermittently nauseated.  His clinic provider recommended he come here to the emergency department for further workup to ensure he is not in DKA given his high blood sugars.    Workup here reveals no evidence of DKA.  He is not acidotic.  Normal serum ketones.  Blood glucose is 399. On recheck after IV fluids blood sugar down to 309.  Creatinine 1.79, which also is likely related to his poorly managed diabetes.  Given his fatigue I did recheck thyroid tests here today and TSH is mildly elevated at 6.38 but his T4 is normal.  He did have his levothyroxine dose decreased in January.  I do not recommend any changes at this point but he should have recheck of this in 4 to 6 weeks.    His symptoms and work-up consistent with poorly manage diabetes.  His clinic provider has already placed order for a visit with diabetic educator, will increase his Lantus at bedtime from 15 units to 17  units, and continue Humalog 7 units prior to meals.  Trying to avoid hypoglycemia.   He needs better diet management.  He also received referral for visit with podiatry in regards to foot ulcer and gout.    Plan:    INCREASE Lantus to 17 units at bedtime.  CONTINUE Humalog 7 units before meals.    Drink plenty of fluids.  Meals should have more vegetable, lean meat, and less carbohydrates.     FOLLOW-UP with Diabetic educator referral was sent by your clinic provider and someone should call you to set this up appointment.  FOLLOW-UP with podiatry for right foot ulcer and gout.Referral was sent by your clinic provider and someone should call you to set up appointment.    Recheck in clinic in 4 - 6 weeks, thyroid test (TSH) can be recheck at that time too.      Discharge Medication List as of 7/28/2025  8:46 PM          Final diagnoses:   Uncontrolled type 2 diabetes mellitus with hyperglycemia (H)   Nausea       7/28/2025   [facility] EMERGENCY DEPT       Tone, KUMAR Gonzalez CNP  07/28/25 2273

## 2025-07-28 NOTE — PROGRESS NOTES
Assessment & Plan       ICD-10-CM    1. Type 2 diabetes mellitus with stage 3b chronic kidney disease, with long-term current use of insulin (H)  E11.22 Hemoglobin A1c    N18.32 Albumin Random Urine Quantitative with Creat Ratio    Z79.4 Comprehensive metabolic panel (BMP + Alb, Alk Phos, ALT, AST, Total. Bili, TP)     Parathyroid Hormone Intact     Comprehensive metabolic panel (BMP + Alb, Alk Phos, ALT, AST, Total. Bili, TP)     Adult Diabetes Education  Referral     LANTUS SOLOSTAR 100 UNIT/ML soln     CANCELED: Basic metabolic panel  (Ca, Cl, CO2, Creat, Gluc, K, Na, BUN)     CANCELED: Hemoglobin A1c     CANCELED: Albumin Random Urine Quantitative with Creat Ratio      2. Chronic tophaceous gout  M1A.9XX1 CBC with platelets     Hep B Virus DNA Quant Real Time PCR     Uric acid     Orthopedic  Referral     levothyroxine (SYNTHROID/LEVOTHROID) 150 MCG tablet     CANCELED: ALT     CANCELED: AST     CANCELED: Creatinine      3. Chronic obstructive pulmonary disease, unspecified COPD type (H)  J44.9       4. Ulcer of right foot, limited to breakdown of skin (H)  L97.511 Orthopedic  Referral      5. Mild recurrent major depression  F33.0 escitalopram (LEXAPRO) 20 MG tablet      6. Hyperlipidemia LDL goal <70  E78.5       7. Benign essential hypertension  I10       8. Screening for prostate cancer  Z12.5 PSA, screen          1. Unfortunately, his diabetic control remains poor with an A1c of 13.8 today. I recommend an increase of his Lantus from 15 to 17 units nightly and recommend he try to eat more hearty meals with proteins, vegetables and low carbs throughout the day to avoid low readings. Continue Humalog 7 units prior to meals and will refer back to the diabetic educator. I would like to avoid a GLP-1 for now as I do not want him losing weight. Will recheck in 2 months.    2, 4. His gout remains poorly controlled and he is working with rheumatology to possibly try a new injectable  medication depending on his labs today. He has a shallow ulcer of his right medial heel although it is much better than it was previously. There is minimal purulence but no signs of a significant infection. Will refer to podiatry for further evaluation and treatment of this ulcer and to hopefully discuss better shoe gear for his painful tophi.     3. Continue inhaler as needed.    5. His depression is still not adequately controlled. He declines referral for counseling but his daughter/family is a very good support system. Will further increase Lexapro to 20 mg.    6. Continue atorvastatin and Zetia..    7. Blood pressure is stable on Lotensin     8. Updated PSA ordered. Consider Flomax in the future for urinary retention/dribbling.    The longitudinal plan of care for the diagnosis(es)/condition(s) as documented were addressed during this visit. Due to the added complexity in care, I will continue to support Diana in the subsequent management and with ongoing continuity of care.    Follow-up in 2 months.    A total of 45 minutes spent on reviewing history, completing exam, discussing plan and completing note.    Subjective   Diana is a 60 year old, presenting for the following health issues:  Hypertension, Lipids, Thyroid Problem, and Recheck Medication      7/28/2025    10:53 AM   Additional Questions   Roomed by Kristie YARBROUGH   Accompanied by Daughter     History of Present Illness       CKD: He uses over the counter pain medication, including tyleno, a few times a week.    Diabetes:   He presents for follow up of diabetes.  He is checking home blood glucose two times daily.   He checks blood glucose before and after meals.  Blood glucose is sometimes over 200 and sometimes under 70. He is aware of hypoglycemia symptoms including shakiness, dizziness, weakness and confusion.   He is concerned about frequent infections, blood sugar frequently over 200 and low blood sugar, several less than 70 in the past few weeks.   He  is having numbness in feet and redness, sores, or blisters on feet.            Hyperlipidemia:  He presents for follow up of hyperlipidemia.   He is taking medication to lower cholesterol. He is having myalgia or other side effects to statin medications.    Hypertension: He presents for follow up of hypertension.  He does check blood pressure  regularly outside of the clinic. Outside blood pressures have been over 140/90. He follows a low salt diet.     Hypothyroidism:     Since last visit, patient describes the following symptoms::  Anxiety, Constipation, Depression, Dry skin, Fatigue, Tremors and Weight loss    Weight loss::  No weight loss    Reason for visit:  Follow up and paper work for [employer]    He eats 4 or more servings of fruits and vegetables daily.He consumes 2 sweetened beverage(s) daily.He exercises with enough effort to increase his heart rate 9 or less minutes per day.  He exercises with enough effort to increase his heart rate 3 or less days per week.   He is taking medications regularly.        His daughter is with him today and provides most of the history and helps translate. Diana remains stressed and depressed, mostly because he cannot do the things he used to enjoy because of his chronic pain from the tophaceous gout. He still has a sore on his right heal and they are keeping it covered. It is not draining and much better than it was but he wants to know when it will go away. His daughter states that she is the only child that takes him out of the house to try and fish or do other things he enjoys as her other siblings do not really do this. They attend his appointments at times but they do not add much input to these visits. She has [place] for work and is hoping to take at least 1 month off to more fully take care of Diana and help with his specialty appointments.    He glucose remains quite high at times but other times, he will be in the 60's and 70's, sometimes upon wakening. He is eating  3-4 meals per day but does not eat much. He is eating a lot of chicken/proteins and likes his salt. His daughter states that his blood pressure occasionally spikes. She also states that they have not told him yet that his nephew is dying of cancer as they are worried how he will react. He denies any thoughts of self harm and remains on Lexapro 15 mg daily with some benefit.       Review of Systems  Constitutional, HEENT, cardiovascular, pulmonary, psych, musculoskeletal, neuro, gi and gu systems are negative, except as otherwise noted.      Objective    /60   Pulse 97   Temp 97.6  F (36.4  C) (Temporal)   Resp 21   Wt 52.6 kg (116 lb)   SpO2 95%   BMI 24.24 kg/m    Body mass index is 24.24 kg/m .  Physical Exam   GENERAL: alert and no distress  RESP: lungs clear to auscultation - no rales, rhonchi or wheezes  CV: regular rate and rhythm, normal S1 S2, no S3 or S4, no murmur, click or rub, no peripheral edema  MS: Multi tophaceous nodules of elbows and feet.   SKIN: Right medial heel tophaceous nodule with shallow ulcer/callus. Tiny amount of central purulence without erythema or warmth. The area is tender to  touch.  NEURO: Normal strength and tone, mentation intact and speech normal. Gait not tested.  PSYCH: mentation appears normal, affect is mostly flat.     Lab Results   Component Value Date    A1C 13.8 07/28/2025    A1C 11.1 01/20/2025             Signed Electronically by: Lenin Jacob PA-C

## 2025-07-28 NOTE — TELEPHONE ENCOUNTER
I called and spoke with patient's daughter Yady, CHYNAC updated during visit today. I alerted her that his glucose from today is 653. She just rechecked it now and it is at 585. He has been more tired today so she states it makes sense why he is more lethargic with his hig readings. His kidneys are also stressed and he is high risk for diabetic ketoacidosis so I recommend he be seen emergently. She will take him to [place] and I will let them know that he is on the way. She voiced appreciation.    George Jacob PA-C

## 2025-07-29 LAB — HBV DNA SERPL NAA+PROBE-ACNC: NOT DETECTED IU/ML

## 2025-07-29 NOTE — DISCHARGE INSTRUCTIONS
INCREASE Lantus to 17 units at bedtime.  CONTINUE Humalog 7 units before meals.    Drink plenty of fluids.  Meals should have more vegetable, lean meat, and less carbohydrates.     FOLLOW-UP with Diabetic educator referral was sent by your clinic provider and someone should call you to set this up appointment.  FOLLOW-UP with podiatry for right foot ulcer and gout.Referral was sent by your clinic provider and someone should call you to set up appointment.    Recheck in clinic in 4 - 6 weeks, thyroid test (TSH) can be recheck at that time too.

## 2025-07-31 ENCOUNTER — APPOINTMENT (OUTPATIENT)
Dept: INTERPRETER SERVICES | Facility: CLINIC | Age: 60
End: 2025-07-31
Payer: COMMERCIAL

## 2025-08-05 ENCOUNTER — TELEPHONE (OUTPATIENT)
Dept: PHARMACY | Facility: OTHER | Age: 60
End: 2025-08-05

## 2025-08-06 ENCOUNTER — OFFICE VISIT (OUTPATIENT)
Dept: PODIATRY | Facility: CLINIC | Age: 60
End: 2025-08-06
Attending: PHYSICIAN ASSISTANT
Payer: COMMERCIAL

## 2025-08-06 ENCOUNTER — ANCILLARY PROCEDURE (OUTPATIENT)
Dept: GENERAL RADIOLOGY | Facility: CLINIC | Age: 60
End: 2025-08-06
Attending: PODIATRIST
Payer: COMMERCIAL

## 2025-08-06 VITALS — HEIGHT: 60 IN | BODY MASS INDEX: 22.77 KG/M2 | WEIGHT: 115.96 LBS

## 2025-08-06 DIAGNOSIS — M1A.9XX1 CHRONIC TOPHACEOUS GOUT: ICD-10-CM

## 2025-08-06 DIAGNOSIS — M79.671 RIGHT FOOT PAIN: ICD-10-CM

## 2025-08-06 DIAGNOSIS — E11.65 UNCONTROLLED TYPE 2 DIABETES MELLITUS WITH HYPERGLYCEMIA (H): Primary | ICD-10-CM

## 2025-08-06 DIAGNOSIS — N18.4 TYPE 2 DIABETES MELLITUS WITH STAGE 4 CHRONIC KIDNEY DISEASE, WITH LONG-TERM CURRENT USE OF INSULIN (H): ICD-10-CM

## 2025-08-06 DIAGNOSIS — E11.22 TYPE 2 DIABETES MELLITUS WITH STAGE 4 CHRONIC KIDNEY DISEASE, WITH LONG-TERM CURRENT USE OF INSULIN (H): ICD-10-CM

## 2025-08-06 DIAGNOSIS — Z79.4 TYPE 2 DIABETES MELLITUS WITH STAGE 4 CHRONIC KIDNEY DISEASE, WITH LONG-TERM CURRENT USE OF INSULIN (H): ICD-10-CM

## 2025-08-06 PROCEDURE — 73630 X-RAY EXAM OF FOOT: CPT | Mod: TC | Performed by: RADIOLOGY

## 2025-08-06 PROCEDURE — 1125F AMNT PAIN NOTED PAIN PRSNT: CPT | Performed by: PODIATRIST

## 2025-08-06 PROCEDURE — 99203 OFFICE O/P NEW LOW 30 MIN: CPT | Performed by: PODIATRIST

## 2025-08-06 ASSESSMENT — PAIN SCALES - GENERAL: PAINLEVEL_OUTOF10: MODERATE PAIN (6)

## 2025-08-11 ENCOUNTER — MYC MEDICAL ADVICE (OUTPATIENT)
Dept: FAMILY MEDICINE | Facility: OTHER | Age: 60
End: 2025-08-11
Payer: COMMERCIAL